# Patient Record
Sex: MALE | Race: WHITE
[De-identification: names, ages, dates, MRNs, and addresses within clinical notes are randomized per-mention and may not be internally consistent; named-entity substitution may affect disease eponyms.]

---

## 2017-01-25 ENCOUNTER — HOSPITAL ENCOUNTER (OUTPATIENT)
Dept: HOSPITAL 62 - LAB | Age: 72
End: 2017-01-25
Attending: INTERNAL MEDICINE
Payer: MEDICARE

## 2017-01-25 DIAGNOSIS — R80.9: ICD-10-CM

## 2017-01-25 DIAGNOSIS — E11.9: ICD-10-CM

## 2017-01-25 DIAGNOSIS — I12.9: Primary | ICD-10-CM

## 2017-01-25 DIAGNOSIS — N18.2: ICD-10-CM

## 2017-01-25 LAB
ANION GAP SERPL CALC-SCNC: 17 MMOL/L (ref 5–19)
APPEARANCE UR: CLEAR
BILIRUB UR QL STRIP: NEGATIVE
BUN SERPL-MCNC: 28 MG/DL (ref 7–20)
CALCIUM: 9.9 MG/DL (ref 8.4–10.2)
CHLORIDE SERPL-SCNC: 104 MMOL/L (ref 98–107)
CO2 SERPL-SCNC: 22 MMOL/L (ref 22–30)
CREAT SERPL-MCNC: 1.16 MG/DL (ref 0.52–1.25)
ERYTHROCYTE [DISTWIDTH] IN BLOOD BY AUTOMATED COUNT: 16.1 % (ref 11.5–14)
GLUCOSE SERPL-MCNC: 162 MG/DL (ref 75–110)
GLUCOSE UR STRIP-MCNC: NEGATIVE MG/DL
HCT VFR BLD CALC: 38.8 % (ref 37.9–51)
HGB BLD-MCNC: 12 G/DL (ref 13.5–17)
HGB HCT DIFFERENCE: -2.8
KETONES UR STRIP-MCNC: NEGATIVE MG/DL
MCH RBC QN AUTO: 25.1 PG (ref 27–33.4)
MCHC RBC AUTO-ENTMCNC: 30.9 G/DL (ref 32–36)
MCV RBC AUTO: 81 FL (ref 80–97)
NITRITE UR QL STRIP: NEGATIVE
PH UR STRIP: 5 [PH] (ref 5–9)
POTASSIUM SERPL-SCNC: 4.9 MMOL/L (ref 3.6–5)
PROT UR STRIP-MCNC: NEGATIVE MG/DL
RBC # BLD AUTO: 4.78 10^6/UL (ref 4.35–5.55)
SODIUM SERPL-SCNC: 142.6 MMOL/L (ref 137–145)
SP GR UR STRIP: 1.02
UROBILINOGEN UR-MCNC: NEGATIVE MG/DL (ref ?–2)
WBC # BLD AUTO: 6.6 10^3/UL (ref 4–10.5)

## 2017-01-25 PROCEDURE — 80048 BASIC METABOLIC PNL TOTAL CA: CPT

## 2017-01-25 PROCEDURE — 85027 COMPLETE CBC AUTOMATED: CPT

## 2017-01-25 PROCEDURE — 81001 URINALYSIS AUTO W/SCOPE: CPT

## 2017-01-25 PROCEDURE — 36415 COLL VENOUS BLD VENIPUNCTURE: CPT

## 2017-08-08 ENCOUNTER — HOSPITAL ENCOUNTER (OUTPATIENT)
Dept: HOSPITAL 62 - LAB | Age: 72
End: 2017-08-08
Attending: INTERNAL MEDICINE
Payer: MEDICARE

## 2017-08-08 DIAGNOSIS — E11.9: Primary | ICD-10-CM

## 2017-08-08 DIAGNOSIS — E78.5: ICD-10-CM

## 2017-08-08 DIAGNOSIS — R35.1: ICD-10-CM

## 2017-08-08 DIAGNOSIS — R53.82: ICD-10-CM

## 2017-08-08 DIAGNOSIS — I10: ICD-10-CM

## 2017-08-08 LAB
ALBUMIN SERPL-MCNC: 4.4 G/DL (ref 3.5–5)
ALP SERPL-CCNC: 72 U/L (ref 38–126)
ALT SERPL-CCNC: 31 U/L (ref 21–72)
ANION GAP SERPL CALC-SCNC: 14 MMOL/L (ref 5–19)
AST SERPL-CCNC: 24 U/L (ref 17–59)
BASOPHILS # BLD AUTO: 0 10^3/UL (ref 0–0.2)
BASOPHILS NFR BLD AUTO: 0.5 % (ref 0–2)
BILIRUB DIRECT SERPL-MCNC: 0.4 MG/DL (ref 0–0.4)
BILIRUB SERPL-MCNC: 0.7 MG/DL (ref 0.2–1.3)
BUN SERPL-MCNC: 21 MG/DL (ref 7–20)
CALCIUM: 9.6 MG/DL (ref 8.4–10.2)
CHLORIDE SERPL-SCNC: 104 MMOL/L (ref 98–107)
CHOLEST SERPL-MCNC: 122.78 MG/DL (ref 0–200)
CO2 SERPL-SCNC: 23 MMOL/L (ref 22–30)
CREAT SERPL-MCNC: 1.17 MG/DL (ref 0.52–1.25)
DIRECT HDL: 29 MG/DL (ref 40–?)
EOSINOPHIL # BLD AUTO: 0.2 10^3/UL (ref 0–0.6)
EOSINOPHIL NFR BLD AUTO: 2.5 % (ref 0–6)
ERYTHROCYTE [DISTWIDTH] IN BLOOD BY AUTOMATED COUNT: 15.1 % (ref 11.5–14)
GLUCOSE SERPL-MCNC: 154 MG/DL (ref 75–110)
HCT VFR BLD CALC: 39.5 % (ref 37.9–51)
HGB BLD-MCNC: 13 G/DL (ref 13.5–17)
HGB HCT DIFFERENCE: -0.5
LDLC SERPL DIRECT ASSAY-MCNC: 42 MG/DL (ref ?–100)
LYMPHOCYTES # BLD AUTO: 2.1 10^3/UL (ref 0.5–4.7)
LYMPHOCYTES NFR BLD AUTO: 26.5 % (ref 13–45)
MCH RBC QN AUTO: 26.5 PG (ref 27–33.4)
MCHC RBC AUTO-ENTMCNC: 33 G/DL (ref 32–36)
MCV RBC AUTO: 80 FL (ref 80–97)
MONOCYTES # BLD AUTO: 0.6 10^3/UL (ref 0.1–1.4)
MONOCYTES NFR BLD AUTO: 8 % (ref 3–13)
NEUTROPHILS # BLD AUTO: 5 10^3/UL (ref 1.7–8.2)
NEUTS SEG NFR BLD AUTO: 62.5 % (ref 42–78)
POTASSIUM SERPL-SCNC: 5 MMOL/L (ref 3.6–5)
PROT SERPL-MCNC: 8.2 G/DL (ref 6.3–8.2)
PSA SERPL-MCNC: 1.08 NG/ML (ref ?–4)
RBC # BLD AUTO: 4.91 10^6/UL (ref 4.35–5.55)
SODIUM SERPL-SCNC: 141 MMOL/L (ref 137–145)
TRIGL SERPL-MCNC: 332 MG/DL (ref ?–150)
VLDLC SERPL CALC-MCNC: 66.4 MG/DL (ref 10–31)
WBC # BLD AUTO: 8 10^3/UL (ref 4–10.5)

## 2017-08-08 PROCEDURE — 80053 COMPREHEN METABOLIC PANEL: CPT

## 2017-08-08 PROCEDURE — 80061 LIPID PANEL: CPT

## 2017-08-08 PROCEDURE — 84153 ASSAY OF PSA TOTAL: CPT

## 2017-08-08 PROCEDURE — 83036 HEMOGLOBIN GLYCOSYLATED A1C: CPT

## 2017-08-08 PROCEDURE — 84443 ASSAY THYROID STIM HORMONE: CPT

## 2017-08-08 PROCEDURE — 85025 COMPLETE CBC W/AUTO DIFF WBC: CPT

## 2017-08-08 PROCEDURE — 36415 COLL VENOUS BLD VENIPUNCTURE: CPT

## 2017-11-28 ENCOUNTER — HOSPITAL ENCOUNTER (OUTPATIENT)
Dept: HOSPITAL 62 - LAB | Age: 72
End: 2017-11-28
Attending: INTERNAL MEDICINE
Payer: MEDICARE

## 2017-11-28 DIAGNOSIS — E11.9: Primary | ICD-10-CM

## 2017-11-28 DIAGNOSIS — I10: ICD-10-CM

## 2017-11-28 DIAGNOSIS — E78.5: ICD-10-CM

## 2017-11-28 LAB
ALBUMIN SERPL-MCNC: 4.4 G/DL (ref 3.5–5)
ALP SERPL-CCNC: 64 U/L (ref 38–126)
ALT SERPL-CCNC: 36 U/L (ref 21–72)
ANION GAP SERPL CALC-SCNC: 15 MMOL/L (ref 5–19)
AST SERPL-CCNC: 28 U/L (ref 17–59)
BASOPHILS # BLD AUTO: 0 10^3/UL (ref 0–0.2)
BASOPHILS NFR BLD AUTO: 0.3 % (ref 0–2)
BILIRUB DIRECT SERPL-MCNC: 0.3 MG/DL (ref 0–0.4)
BILIRUB SERPL-MCNC: 0.6 MG/DL (ref 0.2–1.3)
BUN SERPL-MCNC: 24 MG/DL (ref 7–20)
CALCIUM: 10 MG/DL (ref 8.4–10.2)
CHLORIDE SERPL-SCNC: 103 MMOL/L (ref 98–107)
CHOLEST SERPL-MCNC: 144.39 MG/DL (ref 0–200)
CO2 SERPL-SCNC: 25 MMOL/L (ref 22–30)
CREAT SERPL-MCNC: 1.32 MG/DL (ref 0.52–1.25)
DIRECT HDL: 34 MG/DL (ref 40–?)
EOSINOPHIL # BLD AUTO: 0.1 10^3/UL (ref 0–0.6)
EOSINOPHIL NFR BLD AUTO: 2.3 % (ref 0–6)
ERYTHROCYTE [DISTWIDTH] IN BLOOD BY AUTOMATED COUNT: 16.1 % (ref 11.5–14)
GLUCOSE SERPL-MCNC: 179 MG/DL (ref 75–110)
HCT VFR BLD CALC: 39.9 % (ref 37.9–51)
HGB BLD-MCNC: 12.9 G/DL (ref 13.5–17)
HGB HCT DIFFERENCE: -1.2
LDLC SERPL DIRECT ASSAY-MCNC: 59 MG/DL (ref ?–100)
LYMPHOCYTES # BLD AUTO: 2 10^3/UL (ref 0.5–4.7)
LYMPHOCYTES NFR BLD AUTO: 32.7 % (ref 13–45)
MCH RBC QN AUTO: 26.4 PG (ref 27–33.4)
MCHC RBC AUTO-ENTMCNC: 32.4 G/DL (ref 32–36)
MCV RBC AUTO: 81 FL (ref 80–97)
MONOCYTES # BLD AUTO: 0.6 10^3/UL (ref 0.1–1.4)
MONOCYTES NFR BLD AUTO: 10.5 % (ref 3–13)
NEUTROPHILS # BLD AUTO: 3.2 10^3/UL (ref 1.7–8.2)
NEUTS SEG NFR BLD AUTO: 54.2 % (ref 42–78)
POTASSIUM SERPL-SCNC: 5.3 MMOL/L (ref 3.6–5)
PROT SERPL-MCNC: 7.6 G/DL (ref 6.3–8.2)
RBC # BLD AUTO: 4.9 10^6/UL (ref 4.35–5.55)
SODIUM SERPL-SCNC: 143.3 MMOL/L (ref 137–145)
TRIGL SERPL-MCNC: 362 MG/DL (ref ?–150)
VLDLC SERPL CALC-MCNC: 72.4 MG/DL (ref 10–31)
WBC # BLD AUTO: 6 10^3/UL (ref 4–10.5)

## 2017-11-28 PROCEDURE — 80061 LIPID PANEL: CPT

## 2017-11-28 PROCEDURE — 36415 COLL VENOUS BLD VENIPUNCTURE: CPT

## 2017-11-28 PROCEDURE — 85025 COMPLETE CBC W/AUTO DIFF WBC: CPT

## 2017-11-28 PROCEDURE — 80053 COMPREHEN METABOLIC PANEL: CPT

## 2017-11-28 PROCEDURE — 83036 HEMOGLOBIN GLYCOSYLATED A1C: CPT

## 2017-11-29 ENCOUNTER — HOSPITAL ENCOUNTER (OUTPATIENT)
Dept: HOSPITAL 62 - RAD | Age: 72
End: 2017-11-29
Attending: INTERNAL MEDICINE
Payer: MEDICARE

## 2017-11-29 DIAGNOSIS — M25.511: Primary | ICD-10-CM

## 2017-11-30 NOTE — RADIOLOGY REPORT (SQ)
EXAM DESCRIPTION:  MRI RT UPPER JOINT WITHOUT



COMPLETED DATE/TIME:  11/29/2017 2:38 pm



REASON FOR STUDY:  M25.511 PAIN IN RIGHT SHOULDER M25.511  PAIN IN RIGHT SHOULDER



COMPARISON:  None.



TECHNIQUE:  Right shoulder images acquired and stored on PACS. Multiplanar imaging to include fat sen
sitive sequences such as T1, water sensitive sequences such as FST2/STIR, cartilage sensitive sequenc
es such as FSPD/gradient-echo sequences.



LIMITATIONS:  None.



FINDINGS:  BONE MARROW AND CORTEX: No worrisome bone lesions or marrow replacement. No occult fractur
es.

JOINT OR BURSAL EFFUSION: There is fluid in the subacromial/subdeltoid bursarelated to full-thickness
 supraspinatus tear

GLENO-HUMERAL ARTICULATION: Normal articulation. No subluxation. No cystic change.  Mild chondromalac
ia at the glenohumeral joint.

ACROMION AND AC JOINT: Type 2  with very bulky acromioclavicular joint bony spurring, and narrowing o
f the subacromial recess

ROTATOR CUFF AND INTERVAL: Full-thickness tear distal supraspinatus tendon.  Bone tendinopathy infras
pinatus and subscapularis tendons.

 LABRUM  AND BICEPS LABRAL COMPLEX: Intra-articular long head biceps tendon is thin and high signal f
rom tendinopathy.  Superior labral tear extending throughout the posterior labrum.  No paralabral cys
ts.

REMAINDER OF LABRUM AND IGHL : No gross tear or paralabral cyst formation.  Labral evaluation is less
 than optimal without joint distention. No thickening of IGHL to suggest adhesive capsulitis.

PERIARTICULAR AND ADJACENT SOFT TISSUES: No masses or abnormal nodes.

OTHER: No other significant finding.



IMPRESSION:  Full-thickness supraspinatus tendon tear

Superior labral tear extending throughout the posterior labrum



TECHNICAL DOCUMENTATION:  JOB ID:  [id number]

 2011 [employer]- All Rights Reserved

## 2018-04-11 ENCOUNTER — HOSPITAL ENCOUNTER (OUTPATIENT)
Dept: HOSPITAL 62 - OD | Age: 73
End: 2018-04-11
Attending: FAMILY MEDICINE
Payer: MEDICARE

## 2018-04-11 DIAGNOSIS — R06.2: ICD-10-CM

## 2018-04-11 DIAGNOSIS — J20.9: Primary | ICD-10-CM

## 2018-04-11 PROCEDURE — 71046 X-RAY EXAM CHEST 2 VIEWS: CPT

## 2018-04-11 NOTE — RADIOLOGY REPORT (SQ)
EXAM DESCRIPTION:  CHEST PA/LATERAL



COMPLETED DATE/TIME:  4/11/2018 11:07 am



REASON FOR STUDY:  ACUTE BRONCHITIS, UNSPECIFIED,WHEEZING



COMPARISON:  8/10/2014, 8/6/2014



EXAM PARAMETERS:  NUMBER OF VIEWS: two views

TECHNIQUE: Digital Frontal and Lateral radiographic views of the chest acquired.

RADIATION DOSE: NA

LIMITATIONS: none



FINDINGS:  LUNGS AND PLEURA: No opacities, masses or pneumothorax. No pleural effusion.

MEDIASTINUM AND HILAR STRUCTURES: No masses or contour abnormalities.

HEART AND VASCULAR STRUCTURES: Mild cardiomegaly.

BONES: No acute findings.

HARDWARE: None in the chest.

OTHER: No other significant finding.



IMPRESSION:  Mild cardiomegaly.  No acute infiltrates.



TECHNICAL DOCUMENTATION:  JOB ID:  [id number]

 2011 [employer]- All Rights Reserved



Reading location - IP/workstation name: [email or web address]

## 2018-04-16 ENCOUNTER — HOSPITAL ENCOUNTER (OUTPATIENT)
Dept: HOSPITAL 62 - SP | Age: 73
End: 2018-04-16
Attending: FAMILY MEDICINE
Payer: MEDICARE

## 2018-04-16 DIAGNOSIS — I51.7: Primary | ICD-10-CM

## 2018-04-16 PROCEDURE — 93306 TTE W/DOPPLER COMPLETE: CPT

## 2018-04-17 NOTE — XCELERA REPORT
[address] [address]

                             Tel: [phone]

                             Fax: [phone]



                    Transthoracic Echocardiogram Report

____________________________________________________________________________



Name: BRANDI ROBLES

MRN: [id number]                Age: 72 yrs

Gender: Male                   : 1945

Patient Status: Outpatient     Patient Location: 

Account #: [id number]

Study Date: 2018 09:22 AM

Accession #: [id number]

____________________________________________________________________________



Height: 68 in        Weight: 223 lb        BSA: 2.1 m2



____________________________________________________________________________

Procedure: A complete two-dimensional transthoracic echocardiogram was

performed (2D, M-mode, spectral and color flow Doppler). The study was

technically adequate with some images being suboptimal in quality.

Reason For Study: CARDIOMEGALY





Ordering Physician: MAYRA ROYAL

Performed By: Momo Andersen

____________________________________________________________________________





Interpretation Summary

The left ventricular ejection fraction is normal.

There is borderline concentric left ventricular hypertrophy.

The left ventricle is grossly normal size.

Doppler measurements suggest pseudonormalized left ventricular relaxation,

which is associated with grade II/IV or mild to moderate diastolic

dysfunction

Wall motion cannot be accurately commented on, but no definite regional

wall motion abnormalities noted.

The right ventricular systolic function is normal.

The left atrial size is normal.

The right atrium is normal in size

There is a mild amount of mitral regurgitation

There is no mitral valve stenosis.

There is no aortic valve stenosis

No aortic regurgitation is present.

There is a trace or physiologic amount of tricuspid regurgitation

Tricuspid regurgitation jet envelope not well defined to measure RV

systolic pressure accurately.

The aortic root is not well visualized but is probably normal size.

The inferior vena cava was not well visualized

There is no pericardial effusion.



____________________________________________________________________________



MMode/2D Measurements & Calculations

RVDd: 2.7 cm  LVIDd: 6.0 cm   FS: 33.1 %            Ao root diam: 2.9 cm

IVSd: 0.77 cm LVIDs: 4.0 cm   EDV(Teich): 181.4 ml

              LVPWd: 0.83 cm  ESV(Teich): 71.2 ml   Ao root area: 6.7 cm2

                              EF(Teich): 60.8 %     LA dimension: 3.4 cm



Doppler Measurements & Calculations

MV E max meron:      MV P1/2t max meron:    Ao V2 max:       LV V1 max P.3 cm/sec        93.3 cm/sec          141.6 cm/sec     4.7 mmHg

MV A max meron:      MV P1/2t: 62.9 msec  Ao max PG:       LV V1 max:

83.4 cm/sec                             8.0 mmHg         108.6 cm/sec

MV E/A: 1.1        MVA(P1/2t): 3.5 cm2                   LV dP/dt:

                   MV dec slope:                         1023 mmHg/s

                   434.6 cm/sec2



        _____________________________________________________________

PA V2 max:         TR max meron:

115.5 cm/sec       217.2 cm/sec

PA max PG:         TR max P.9 mmHg

5.3 mmHg

____________________________________________________________________________



Left Ventricle

The left ventricle is grossly normal size. There is borderline concentric

left ventricular hypertrophy. The left ventricular ejection fraction is

normal. Doppler measurements suggest pseudonormalized left ventricular

relaxation, which is associated with grade II/IV or mild to moderate

diastolic dysfunction. Wall motion cannot be accurately commented on, but

no definite regional wall motion abnormalities noted.



Right Ventricle

The right ventricle is grossly normal size. There is normal right

ventricular wall thickness. The right ventricular systolic function is

normal.



Atria

The right atrium is normal in size. The left atrial size is normal.

Interarterial septum not well visualized and not well dopplered. Cannot

comment on ASD/PFO presence.



Mitral Valve

The mitral valve is grossly normal. There is no mitral valve stenosis.

There is a mild amount of mitral regurgitation.





Aortic Valve

The aortic valve is not well visualized secondary to technical limitations.

There is no aortic valve stenosis. No aortic regurgitation is present.



Tricuspid Valve

The tricuspid valve is not well visualized secondary to technical

limitations. There is no tricuspid stenosis. There is a trace or

physiologic amount of tricuspid regurgitation. Tricuspid regurgitation jet

envelope not well defined to measure RV systolic pressure accurately.



Pulmonic Valve

The pulmonic valve is not well visualized.



Great Vessels

The aortic root is not well visualized but is probably normal size. The

inferior vena cava was not well visualized.



Effusions

There is no pericardial effusion.





____________________________________________________________________________

Electronically signed by:      Mnoa Caldera      on 2018 10:07 AM



CC: MAYRA ROYAL

>

Mona Caldera

## 2018-07-16 ENCOUNTER — HOSPITAL ENCOUNTER (EMERGENCY)
Dept: HOSPITAL 62 - ER | Age: 73
Discharge: HOME | End: 2018-07-16
Payer: MEDICARE

## 2018-07-16 VITALS — SYSTOLIC BLOOD PRESSURE: 139 MMHG | DIASTOLIC BLOOD PRESSURE: 76 MMHG

## 2018-07-16 DIAGNOSIS — Z86.61: ICD-10-CM

## 2018-07-16 DIAGNOSIS — M54.2: ICD-10-CM

## 2018-07-16 DIAGNOSIS — R51: ICD-10-CM

## 2018-07-16 DIAGNOSIS — E11.9: ICD-10-CM
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
## 2018-07-16 DIAGNOSIS — S16.1XXA: Primary | ICD-10-CM

## 2018-07-16 LAB
ADD MANUAL DIFF: NO
ANION GAP SERPL CALC-SCNC: 16 MMOL/L (ref 5–19)
BASOPHILS # BLD AUTO: 0 10^3/UL (ref 0–0.2)
BASOPHILS NFR BLD AUTO: 0.4 % (ref 0–2)
BUN SERPL-MCNC: 25 MG/DL (ref 7–20)
CALCIUM: 9.5 MG/DL (ref 8.4–10.2)
CHLORIDE SERPL-SCNC: 102 MMOL/L (ref 98–107)
CO2 SERPL-SCNC: 27 MMOL/L (ref 22–30)
EOSINOPHIL # BLD AUTO: 0 10^3/UL (ref 0–0.6)
EOSINOPHIL NFR BLD AUTO: 0.4 % (ref 0–6)
ERYTHROCYTE [DISTWIDTH] IN BLOOD BY AUTOMATED COUNT: 15.9 % (ref 11.5–14)
GLUCOSE SERPL-MCNC: 186 MG/DL (ref 75–110)
HCT VFR BLD CALC: 37.1 % (ref 37.9–51)
HGB BLD-MCNC: 12.1 G/DL (ref 13.5–17)
LYMPHOCYTES # BLD AUTO: 1.6 10^3/UL (ref 0.5–4.7)
LYMPHOCYTES NFR BLD AUTO: 14.8 % (ref 13–45)
MCH RBC QN AUTO: 26.5 PG (ref 27–33.4)
MCHC RBC AUTO-ENTMCNC: 32.6 G/DL (ref 32–36)
MCV RBC AUTO: 81 FL (ref 80–97)
MONOCYTES # BLD AUTO: 1 10^3/UL (ref 0.1–1.4)
MONOCYTES NFR BLD AUTO: 9 % (ref 3–13)
NEUTROPHILS # BLD AUTO: 8 10^3/UL (ref 1.7–8.2)
NEUTS SEG NFR BLD AUTO: 75.4 % (ref 42–78)
PLATELET # BLD: 296 10^3/UL (ref 150–450)
POTASSIUM SERPL-SCNC: 5.3 MMOL/L (ref 3.6–5)
RBC # BLD AUTO: 4.56 10^6/UL (ref 4.35–5.55)
SODIUM SERPL-SCNC: 144.7 MMOL/L (ref 137–145)
TOTAL CELLS COUNTED % (AUTO): 100 %
WBC # BLD AUTO: 10.6 10^3/UL (ref 4–10.5)

## 2018-07-16 PROCEDURE — 85025 COMPLETE CBC W/AUTO DIFF WBC: CPT

## 2018-07-16 PROCEDURE — 36415 COLL VENOUS BLD VENIPUNCTURE: CPT

## 2018-07-16 PROCEDURE — 99284 EMERGENCY DEPT VISIT MOD MDM: CPT

## 2018-07-16 PROCEDURE — 80048 BASIC METABOLIC PNL TOTAL CA: CPT

## 2018-07-16 NOTE — ER DOCUMENT REPORT
ED General





- General


Chief Complaint: Headache


Stated Complaint: HEADACHE


Time Seen by Provider: 07/16/18 15:35


Mode of Arrival: Ambulatory


Information source: Patient, Relative


Notes: 





72-year-old male with type 2 diabetes presents with complaint of headache and 

neck pain that started 5 days prior to arrival.  Patient describes the pain as 

constant, throbbing and worse with movement.  Patient did have a headache 

yesterday but does not have one now.  He denies any fever, chills, nausea,, 

sick contacts.  When asked if patient had prior similar symptoms EMS when he 

had viral meningitis in 2014.  He also states that this pain feels when he had 

meningitis.  He states the difference is that time he was confused, combative 

and felt much sicker.  He denies any injury to his neck, recent falls.  He has 

tried Motrin with


TRAVEL OUTSIDE OF THE [place] IN LAST 30 DAYS: No





- HPI


Onset: Last week


Onset/Duration: Gradual, Persistent, Worse


Quality of pain: Throbbing


Severity: Moderate


Pain Level: 1


Associated symptoms: denies: Body/muscle aches, Chest pain, Earache, Fever, 

Hurts to breath, Nausea, Vomiting, Sinus pain/drainage, Shortness of breath, 

Sweating, Weakness


Exacerbated by: Movement


Relieved by: Remaining still


Similar symptoms previously: Yes


Recently seen / treated by doctor: No





- Related Data


Allergies/Adverse Reactions: 


 





No Known Allergies Allergy (Verified 08/06/14 18:26)


 











Past Medical History





- General


Information source: Patient, [facility] Records





- Social History


Smoking Status: Never Smoker


Frequency of alcohol use: Rare


Drug Abuse: None


Lives with: Family


Family History: Reviewed & Not Pertinent


Patient has suicidal ideation: No


Patient has homicidal ideation: No


Pulmonary Medical History: Reports: Hx Bronchitis


Endocrine Medical History: Reports: Hx Diabetes Mellitus Type 2


Renal/ Medical History: Denies: Hx Peritoneal Dialysis





Review of Systems





- Review of Systems


Constitutional: denies: Chills, Diaphoresis, Fever, Weakness, Recent illness


EENT: denies: Blurred vision, Double vision, Difficulty swallowing


Cardiovascular: denies: Chest pain, Palpitations, Dizziness, Lightheaded


Respiratory: denies: Cough, Short of breath


Gastrointestinal: denies: Abdominal pain, Nausea, Vomiting


Genitourinary: denies: Dysuria, Flank pain


Male Genitourinary: No symptoms reported


Musculoskeletal: Muscle stiffness, Neck pain.  denies: Back pain


Skin: denies: Rash


Hematologic/Lymphatic: denies: Easy bruising


Neurological/Psychological: denies: Confusion, Hallucinations, Weakness, Lost 

consciousness, Headaches


-: Yes All other systems reviewed and negative





Physical Exam





- Vital signs


Vitals: 


 











Temp Pulse Resp BP Pulse Ox


 


 97.7 F   94   18   149/81 H  95 


 


 07/16/18 15:15  07/16/18 15:15  07/16/18 15:15  07/16/18 15:15  07/16/18 15:15














- Notes


Notes: 





PHYSICAL EXAMINATION:





GENERAL: Well-appearing, well-nourished and in no acute distress.





HEAD: Atraumatic, normocephalic.





EYES: Pupils equal round and reactive to light, extraocular movements intact, 

sclera anicteric, conjunctiva are normal.





ENT: Nares patent, oropharynx clear without exudates.  Moist mucous membranes.





NECK: Supple without lymphadenopathy.  No nuchal rigidity, meningismus.  

Patient does have limited range of motion secondary to pain.  No midline 

tenderness.  Tenderness to palpation of the occiput.





LUNGS: Breath sounds clear to auscultation bilaterally and equal.  No wheezes 

rales or rhonchi.





HEART: Regular rate and rhythm without murmurs





ABDOMEN: Soft, nontender, nondistended abdomen.  No guarding, no rebound.  No 

masses appreciated.





Musculoskeletal: Normal range of motion, no pitting or edema.  No cyanosis.





NEUROLOGICAL: Cranial nerves grossly intact.  Normal speech, normal gait.  

Normal sensory, motor exams 





PSYCH: Normal mood, normal affect.





SKIN: Warm, Dry, normal turgor, no rashes or lesions noted.





Course





- Re-evaluation


Re-evalutation: 





07/16/18 19:01





Laboratory











  07/16/18 07/16/18





  16:20 16:20


 


WBC  10.6 H 


 


RBC  4.56 


 


Hgb  12.1 L 


 


Hct  37.1 L 


 


MCV  81 


 


MCH  26.5 L 


 


MCHC  32.6 


 


RDW  15.9 H 


 


Plt Count  296 


 


Seg Neutrophils %  75.4 


 


Lymphocytes %  14.8 


 


Monocytes %  9.0 


 


Eosinophils %  0.4 


 


Basophils %  0.4 


 


Absolute Neutrophils  8.0 


 


Absolute Lymphocytes  1.6 


 


Absolute Monocytes  1.0 


 


Absolute Eosinophils  0.0 


 


Absolute Basophils  0.0 


 


Sodium   144.7


 


Potassium   5.3 H


 


Chloride   102


 


Carbon Dioxide   27


 


Anion Gap   16


 


BUN   25 H


 


Creatinine   1.32 H


 


Est GFR ( Amer)   > 60


 


Est GFR (Non-Af Amer)   53 L


 


Glucose   186 H


 


Calcium   9.5








Patient reevaluated and states neck pain has greatly improved.  He has not had 

any headache during his ED course.  We did discuss his history of viral 

meningitis and a lumbar puncture which the patient declines at this time.  He 

states that this pain feels completely different than when he had viral 

meningitis in 2014.  Daughter is at the bedside and states that patient was 

altered at that time, complaining of a severe headache.  CBC is without a 

significant leukocytosis or anemia.  CMP does show an elevated creatinine 1.32 

but upon review of patient's previous lab work this is his baseline.  Patient's 

exam is just not consistent with viral or bacterial meningitis.  Exam is more 

consistent with musculoskeletal neck pain.  Patient did receive [place] during 

his ED course and states that he would like to try a short course for home.


07/17/18 20:09








- Vital Signs


Vital signs: 


 











Temp Pulse Resp BP Pulse Ox


 


 97.7 F   90   18   139/76 H  98 


 


 07/16/18 15:15  07/16/18 19:57  07/16/18 19:57  07/16/18 19:57  07/16/18 19:57














- Laboratory


Result Diagrams: 


 07/16/18 16:20





 07/16/18 16:20


Laboratory results interpreted by me: 


 











  07/16/18 07/16/18





  16:20 16:20


 


WBC  10.6 H 


 


Hgb  12.1 L 


 


Hct  37.1 L 


 


MCH  26.5 L 


 


RDW  15.9 H 


 


Potassium   5.3 H


 


BUN   25 H


 


Creatinine   1.32 H


 


Est GFR (Non-Af Amer)   53 L


 


Glucose   186 H














Discharge





- Discharge


Clinical Impression: 


Cervical strain, acute


Qualifiers:


 Encounter type: initial encounter Qualified Code(s): S16.1XXA - Strain of 

muscle, fascia and tendon at neck level, initial encounter





Condition: Good


Disposition: HOME, SELF-CARE


Instructions:  Neck Injury (Cervical Strain) ([facility])


Additional Instructions: 


Follow up with your physician tomorrow for further care or return to the ED 

IMMEDIATELY if symptoms worsen or new concerns occur. If you cannot afford to 

follow up with your primary care physician a list of low cost clinics have been 

provided at the end of your discharge papers as well.


Prescriptions: 


Hydrocodone/Acetaminophen [Norco 5-325 mg Tablet] 1 tab PO Q6H #12 tablet


Referrals: 


MAYRA ROAYL MD [COMMUNITY BASED STAFF] - Follow up in 3-5 days

## 2018-07-16 NOTE — ER DOCUMENT REPORT
ED Medical Screen (RME)





- General


Chief Complaint: Headache


Stated Complaint: HEADACHE


Time Seen by Provider: 07/16/18 15:35


Notes: 





RAPID MEDICAL EVALUATION DISCLOSURE


I have seen this patient as part of a Rapid Medical Evaluation and, if 

applicable, placed any initially appropriate orders. The patient will be seen 

and fully evaluated, including a full history and physical exam, by a provider (

in Main ED or Fast Track) when a room becomes available.





------------------------------------------------------------------





72M here w c/o neck pain past few days but worse today and much more severe. He 

has also had a HA in the back of the head but does not have one at this time. 

he has tried motrin for the sx without much relief. He initially told the [facility] 

RN that it felt like his meningitis previously but denies this to me. He has 

also been having whole body cramps "every single night". He was recently tx for 

bronchitis and was doing a lot of coughing and wonders if this may have caused 

him to have the neck pain/HA.





EXAM


exquisite bilateral cervical paraspinal mm TTP


strength 5/5 w intact sensation all extremities











TRAVEL OUTSIDE OF THE [place] IN LAST 30 DAYS: No





- Related Data


Allergies/Adverse Reactions: 


 





No Known Allergies Allergy (Verified 08/06/14 18:26)


 











Physical Exam





- Vital signs


Vitals: 





 











Temp Pulse Resp BP Pulse Ox


 


 97.7 F   94   18   149/81 H  95 


 


 07/16/18 15:15  07/16/18 15:15  07/16/18 15:15  07/16/18 15:15  07/16/18 15:15














Course





- Vital Signs


Vital signs: 





 











Temp Pulse Resp BP Pulse Ox


 


 97.7 F   94   18   149/81 H  95 


 


 07/16/18 15:15  07/16/18 15:15  07/16/18 15:15  07/16/18 15:15  07/16/18 15:15














Doctor's Discharge





- Discharge


Referrals: 


MAYRA ROYAL MD [Primary Care Provider] - Follow up as needed

## 2018-07-23 ENCOUNTER — HOSPITAL ENCOUNTER (OUTPATIENT)
Dept: HOSPITAL 62 - LAB | Age: 73
End: 2018-07-23
Attending: INTERNAL MEDICINE
Payer: MEDICARE

## 2018-07-23 DIAGNOSIS — R35.1: ICD-10-CM

## 2018-07-23 DIAGNOSIS — Z12.5: ICD-10-CM

## 2018-07-23 DIAGNOSIS — E78.4: ICD-10-CM

## 2018-07-23 DIAGNOSIS — R53.83: ICD-10-CM

## 2018-07-23 DIAGNOSIS — E11.9: Primary | ICD-10-CM

## 2018-07-23 DIAGNOSIS — I10: ICD-10-CM

## 2018-07-23 LAB
ADD MANUAL DIFF: NO
ALBUMIN SERPL-MCNC: 4.1 G/DL (ref 3.5–5)
ALP SERPL-CCNC: 66 U/L (ref 38–126)
ALT SERPL-CCNC: 29 U/L (ref 21–72)
ANION GAP SERPL CALC-SCNC: 14 MMOL/L (ref 5–19)
AST SERPL-CCNC: 40 U/L (ref 17–59)
BASOPHILS # BLD AUTO: 0 10^3/UL (ref 0–0.2)
BASOPHILS NFR BLD AUTO: 0.4 % (ref 0–2)
BILIRUB DIRECT SERPL-MCNC: 0.3 MG/DL (ref 0–0.4)
BILIRUB SERPL-MCNC: 0.6 MG/DL (ref 0.2–1.3)
BUN SERPL-MCNC: 29 MG/DL (ref 7–20)
CALCIUM: 9.5 MG/DL (ref 8.4–10.2)
CHLORIDE SERPL-SCNC: 103 MMOL/L (ref 98–107)
CHOLEST SERPL-MCNC: 102.77 MG/DL (ref 0–200)
CO2 SERPL-SCNC: 27 MMOL/L (ref 22–30)
EOSINOPHIL # BLD AUTO: 0.1 10^3/UL (ref 0–0.6)
EOSINOPHIL NFR BLD AUTO: 1.9 % (ref 0–6)
ERYTHROCYTE [DISTWIDTH] IN BLOOD BY AUTOMATED COUNT: 16 % (ref 11.5–14)
GLUCOSE SERPL-MCNC: 157 MG/DL (ref 75–110)
HCT VFR BLD CALC: 38.6 % (ref 37.9–51)
HGB BLD-MCNC: 12.6 G/DL (ref 13.5–17)
LDLC SERPL DIRECT ASSAY-MCNC: 35 MG/DL (ref ?–100)
LYMPHOCYTES # BLD AUTO: 1.8 10^3/UL (ref 0.5–4.7)
LYMPHOCYTES NFR BLD AUTO: 25.1 % (ref 13–45)
MCH RBC QN AUTO: 25.8 PG (ref 27–33.4)
MCHC RBC AUTO-ENTMCNC: 32.6 G/DL (ref 32–36)
MCV RBC AUTO: 79 FL (ref 80–97)
MONOCYTES # BLD AUTO: 0.6 10^3/UL (ref 0.1–1.4)
MONOCYTES NFR BLD AUTO: 7.9 % (ref 3–13)
NEUTROPHILS # BLD AUTO: 4.7 10^3/UL (ref 1.7–8.2)
NEUTS SEG NFR BLD AUTO: 64.7 % (ref 42–78)
PLATELET # BLD: 315 10^3/UL (ref 150–450)
POTASSIUM SERPL-SCNC: 5.3 MMOL/L (ref 3.6–5)
PROT SERPL-MCNC: 7.7 G/DL (ref 6.3–8.2)
RBC # BLD AUTO: 4.87 10^6/UL (ref 4.35–5.55)
SODIUM SERPL-SCNC: 144 MMOL/L (ref 137–145)
TOTAL CELLS COUNTED % (AUTO): 100 %
TRIGL SERPL-MCNC: 278 MG/DL (ref ?–150)
VLDLC SERPL CALC-MCNC: 55.6 MG/DL (ref 10–31)
WBC # BLD AUTO: 7.3 10^3/UL (ref 4–10.5)

## 2018-07-23 PROCEDURE — 84443 ASSAY THYROID STIM HORMONE: CPT

## 2018-07-23 PROCEDURE — G0103 PSA SCREENING: HCPCS

## 2018-07-23 PROCEDURE — 36415 COLL VENOUS BLD VENIPUNCTURE: CPT

## 2018-07-23 PROCEDURE — 80053 COMPREHEN METABOLIC PANEL: CPT

## 2018-07-23 PROCEDURE — 85025 COMPLETE CBC W/AUTO DIFF WBC: CPT

## 2018-07-23 PROCEDURE — 83036 HEMOGLOBIN GLYCOSYLATED A1C: CPT

## 2018-07-23 PROCEDURE — 80061 LIPID PANEL: CPT

## 2018-10-17 ENCOUNTER — HOSPITAL ENCOUNTER (OUTPATIENT)
Dept: HOSPITAL 62 - ER | Age: 73
Setting detail: OBSERVATION
LOS: 2 days | Discharge: HOME | End: 2018-10-19
Attending: INTERNAL MEDICINE | Admitting: INTERNAL MEDICINE
Payer: MEDICARE

## 2018-10-17 DIAGNOSIS — F40.240: ICD-10-CM

## 2018-10-17 DIAGNOSIS — M47.892: ICD-10-CM

## 2018-10-17 DIAGNOSIS — E11.22: ICD-10-CM

## 2018-10-17 DIAGNOSIS — Z87.891: ICD-10-CM

## 2018-10-17 DIAGNOSIS — E66.01: ICD-10-CM

## 2018-10-17 DIAGNOSIS — M50.33: ICD-10-CM

## 2018-10-17 DIAGNOSIS — Z79.4: ICD-10-CM

## 2018-10-17 DIAGNOSIS — R05: ICD-10-CM

## 2018-10-17 DIAGNOSIS — R51: ICD-10-CM

## 2018-10-17 DIAGNOSIS — E83.42: ICD-10-CM

## 2018-10-17 DIAGNOSIS — N18.3: ICD-10-CM

## 2018-10-17 DIAGNOSIS — Z79.899: ICD-10-CM

## 2018-10-17 DIAGNOSIS — E87.5: Primary | ICD-10-CM

## 2018-10-17 DIAGNOSIS — E78.5: ICD-10-CM

## 2018-10-17 DIAGNOSIS — Z79.82: ICD-10-CM

## 2018-10-17 DIAGNOSIS — Z86.19: ICD-10-CM

## 2018-10-17 DIAGNOSIS — I12.9: ICD-10-CM

## 2018-10-17 LAB
ADD MANUAL DIFF: NO
ALBUMIN SERPL-MCNC: 4.1 G/DL (ref 3.5–5)
ALP SERPL-CCNC: 66 U/L (ref 38–126)
ALT SERPL-CCNC: 23 U/L (ref 21–72)
ANION GAP SERPL CALC-SCNC: 10 MMOL/L (ref 5–19)
AST SERPL-CCNC: 26 U/L (ref 17–59)
BASOPHILS # BLD AUTO: 0 10^3/UL (ref 0–0.2)
BASOPHILS NFR BLD AUTO: 0.2 % (ref 0–2)
BILIRUB DIRECT SERPL-MCNC: 0.3 MG/DL (ref 0–0.4)
BILIRUB SERPL-MCNC: 0.8 MG/DL (ref 0.2–1.3)
BUN SERPL-MCNC: 19 MG/DL (ref 7–20)
CALCIUM: 9.5 MG/DL (ref 8.4–10.2)
CHLORIDE SERPL-SCNC: 104 MMOL/L (ref 98–107)
CO2 SERPL-SCNC: 24 MMOL/L (ref 22–30)
EOSINOPHIL # BLD AUTO: 0.1 10^3/UL (ref 0–0.6)
EOSINOPHIL NFR BLD AUTO: 1.4 % (ref 0–6)
ERYTHROCYTE [DISTWIDTH] IN BLOOD BY AUTOMATED COUNT: 16.3 % (ref 11.5–14)
GLUCOSE SERPL-MCNC: 167 MG/DL (ref 75–110)
HCT VFR BLD CALC: 38.6 % (ref 37.9–51)
HGB BLD-MCNC: 12.6 G/DL (ref 13.5–17)
INR PPP: 0.98
LYMPHOCYTES # BLD AUTO: 1.9 10^3/UL (ref 0.5–4.7)
LYMPHOCYTES NFR BLD AUTO: 20.1 % (ref 13–45)
MCH RBC QN AUTO: 26.4 PG (ref 27–33.4)
MCHC RBC AUTO-ENTMCNC: 32.6 G/DL (ref 32–36)
MCV RBC AUTO: 81 FL (ref 80–97)
MONOCYTES # BLD AUTO: 0.7 10^3/UL (ref 0.1–1.4)
MONOCYTES NFR BLD AUTO: 8 % (ref 3–13)
NEUTROPHILS # BLD AUTO: 6.5 10^3/UL (ref 1.7–8.2)
NEUTS SEG NFR BLD AUTO: 70.3 % (ref 42–78)
PLATELET # BLD: 229 10^3/UL (ref 150–450)
POTASSIUM SERPL-SCNC: 6.5 MMOL/L (ref 3.6–5)
PROT SERPL-MCNC: 7.6 G/DL (ref 6.3–8.2)
PROTHROMBIN TIME: 13.5 SEC (ref 11.4–15.4)
RBC # BLD AUTO: 4.77 10^6/UL (ref 4.35–5.55)
SODIUM SERPL-SCNC: 137.7 MMOL/L (ref 137–145)
TOTAL CELLS COUNTED % (AUTO): 100 %
WBC # BLD AUTO: 9.2 10^3/UL (ref 4–10.5)

## 2018-10-17 PROCEDURE — 96375 TX/PRO/DX INJ NEW DRUG ADDON: CPT

## 2018-10-17 PROCEDURE — 82962 GLUCOSE BLOOD TEST: CPT

## 2018-10-17 PROCEDURE — 84132 ASSAY OF SERUM POTASSIUM: CPT

## 2018-10-17 PROCEDURE — 81001 URINALYSIS AUTO W/SCOPE: CPT

## 2018-10-17 PROCEDURE — 87040 BLOOD CULTURE FOR BACTERIA: CPT

## 2018-10-17 PROCEDURE — 36415 COLL VENOUS BLD VENIPUNCTURE: CPT

## 2018-10-17 PROCEDURE — G0378 HOSPITAL OBSERVATION PER HR: HCPCS

## 2018-10-17 PROCEDURE — 96374 THER/PROPH/DIAG INJ IV PUSH: CPT

## 2018-10-17 PROCEDURE — 80048 BASIC METABOLIC PNL TOTAL CA: CPT

## 2018-10-17 PROCEDURE — 94640 AIRWAY INHALATION TREATMENT: CPT

## 2018-10-17 PROCEDURE — 93005 ELECTROCARDIOGRAM TRACING: CPT

## 2018-10-17 PROCEDURE — 71046 X-RAY EXAM CHEST 2 VIEWS: CPT

## 2018-10-17 PROCEDURE — 80053 COMPREHEN METABOLIC PANEL: CPT

## 2018-10-17 PROCEDURE — 83735 ASSAY OF MAGNESIUM: CPT

## 2018-10-17 PROCEDURE — 85025 COMPLETE CBC W/AUTO DIFF WBC: CPT

## 2018-10-17 PROCEDURE — 72125 CT NECK SPINE W/O DYE: CPT

## 2018-10-17 PROCEDURE — 85610 PROTHROMBIN TIME: CPT

## 2018-10-17 PROCEDURE — 99285 EMERGENCY DEPT VISIT HI MDM: CPT

## 2018-10-17 PROCEDURE — 93010 ELECTROCARDIOGRAM REPORT: CPT

## 2018-10-17 PROCEDURE — 83605 ASSAY OF LACTIC ACID: CPT

## 2018-10-17 PROCEDURE — 70450 CT HEAD/BRAIN W/O DYE: CPT

## 2018-10-17 NOTE — RADIOLOGY REPORT (SQ)
EXAM DESCRIPTION:  CT CERVICAL SPINE WITHOUT



COMPLETED DATE/TIME:  10/17/2018 8:04 pm



REASON FOR STUDY:  Headache, neck pain



COMPARISON:  None.



TECHNIQUE:  Axial images acquired through the cervical spine without intravenous contrast.  Images re
viewed with lung, soft tissue and bone windows.  Reconstructed coronal and sagittal MPR images review
ed.  Images stored on PACS.

All CT scanners at this facility use dose modulation, iterative reconstruction, and/or weight based d
osing when appropriate to reduce radiation dose to as low as reasonably achievable (ALARA).

[facility]: Dose Right  [facility]: CareDose    [facility]: Dose Right    [facility]: Teradose 4D    [facility]: Smart Technologies



RADIATION DOSE:  CT Rad equipment meets quality standard of care and radiation dose reduction techniq
ues were employed. CTDIvol: 24.2 mGy. DLP: 487 mGy-cm. mGy.



LIMITATIONS:  None.



FINDINGS:  ALIGNMENT: Anatomic.

MINERALIZATION: Normal.

VERTEBRAL BODIES: No fractures or dislocation.

DISCS: Disc spaces are narrowed from C3-C7 with small marginal osteophytes.

FACETS, LATERAL MASSES, POSTERIOR ELEMENTS: Multilevel facet hypertrophy, right more than left.

HARDWARE: None in the spine.

VISUALIZED RIBS: No fractures.

LUNG APICES AND SOFT TISSUES: No significant or acute findings.

OTHER: No other significant finding.



IMPRESSION:  Degenerative disc disease, facet arthropathy.  Mild spondylosis.  No acute findings.



TECHNICAL DOCUMENTATION:  JOB ID:  [id number]

Quality ID # 436: Final reports with documentation of one or more dose reduction techniques (e.g., Au
tomated exposure control, adjustment of the mA and/or kV according to patient size, use of iterative 
reconstruction technique)

 2011 [employer]- All Rights Reserved



Reading location - IP/workstation name: [email or web address]

## 2018-10-17 NOTE — RADIOLOGY REPORT (SQ)
EXAM DESCRIPTION:  CT HEAD WITHOUT



COMPLETED DATE/TIME:  10/17/2018 8:04 pm



REASON FOR STUDY:  Headache, neck pain



COMPARISON:  8/6/2014



TECHNIQUE:  Axial images acquired through the brain without intravenous contrast.  Images reviewed wi
th bone, brain and subdural windows.  Additional sagittal and coronal reconstructions were generated.
 Images stored on PACS.

All CT scanners at this facility use dose modulation, iterative reconstruction, and/or weight based d
osing when appropriate to reduce radiation dose to as low as reasonably achievable (ALARA).

[facility]: Dose Right  [facility]: CareDose    [facility]: Dose Right    [facility]: Teradose 4D    [facility]: Smart [employer] Match



RADIATION DOSE:  CT Rad equipment meets quality standard of care and radiation dose reduction techniq
ues were employed. CTDIvol: 53.2 mGy. DLP: 964 mGy-cm. mGy.



LIMITATIONS:  None.



FINDINGS:  VENTRICLES: Normal size and contour.

CEREBRUM: No masses.  No hemorrhage.  No midline shift.  No evidence for acute infarction. Few scatte
red areas of low density in the white matter most likely chronic small vessel ischemic changes.

CEREBELLUM: No masses.  No hemorrhage.  No alteration of density.  No evidence for acute infarction.

EXTRAAXIAL SPACES: No fluid collections.  No masses.

ORBITS AND GLOBE: No intra- or extraconal masses.  Normal contour of globe without masses.

CALVARIUM: No fracture.

PARANASAL SINUSES: No fluid or mucosal thickening.

SOFT TISSUES: No mass or hematoma.

OTHER: No other significant finding.



IMPRESSION:  MILD CHRONIC MICROVASCULAR ISCHEMIA. NO ACUTE IMAGING FINDINGS IN THE BRAIN.

EVIDENCE OF ACUTE STROKE: NO.



COMMENT:  Quality ID # 436: Final reports with documentation of one or more dose reduction techniques
 (e.g., Automated exposure control, adjustment of the mA and/or kV according to patient size, use of 
iterative reconstruction technique)



TECHNICAL DOCUMENTATION:  JOB ID:  [id number]

 2011 [employer]- All Rights Reserved



Reading location - IP/workstation name: [email or web address]

## 2018-10-17 NOTE — RADIOLOGY REPORT (SQ)
EXAM DESCRIPTION:  CHEST 2 VIEWS



COMPLETED DATE/TIME:  10/17/2018 8:06 pm



REASON FOR STUDY:  cough



COMPARISON:  8/10/2014



EXAM PARAMETERS:  NUMBER OF VIEWS: two views

TECHNIQUE: Digital Frontal and Lateral radiographic views of the chest acquired.

RADIATION DOSE: NA

LIMITATIONS: none



FINDINGS:  LUNGS AND PLEURA: No opacities, masses or pneumothorax. No pleural effusion.

MEDIASTINUM AND HILAR STRUCTURES: No masses or contour abnormalities.

HEART AND VASCULAR STRUCTURES: Heart normal size.  No evidence for failure.

BONES: No acute findings.

HARDWARE: None in the chest.

OTHER: No other significant finding.



IMPRESSION:  NO ACUTE RADIOGRAPHIC FINDING IN THE CHEST.



TECHNICAL DOCUMENTATION:  JOB ID:  [id number]

 2011 [employer]- All Rights Reserved



Reading location - IP/workstation name: [email or web address]
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
## 2018-10-18 LAB
ANION GAP SERPL CALC-SCNC: 16 MMOL/L (ref 5–19)
APPEARANCE UR: CLEAR
APTT PPP: YELLOW S
BILIRUB UR QL STRIP: NEGATIVE
BUN SERPL-MCNC: 19 MG/DL (ref 7–20)
CALCIUM: 9.9 MG/DL (ref 8.4–10.2)
CHLORIDE SERPL-SCNC: 102 MMOL/L (ref 98–107)
CO2 SERPL-SCNC: 22 MMOL/L (ref 22–30)
GLUCOSE SERPL-MCNC: 196 MG/DL (ref 75–110)
GLUCOSE UR STRIP-MCNC: 50 MG/DL
KETONES UR STRIP-MCNC: NEGATIVE MG/DL
NITRITE UR QL STRIP: NEGATIVE
PH UR STRIP: 5 [PH] (ref 5–9)
POTASSIUM SERPL-SCNC: 5.3 MMOL/L (ref 3.6–5)
PROT UR STRIP-MCNC: NEGATIVE MG/DL
SODIUM SERPL-SCNC: 139.5 MMOL/L (ref 137–145)
SP GR UR STRIP: 1.01
UROBILINOGEN UR-MCNC: NEGATIVE MG/DL (ref ?–2)

## 2018-10-18 RX ADMIN — ACETAMINOPHEN PRN MG: 325 TABLET ORAL at 02:27

## 2018-10-18 RX ADMIN — LIDOCAINE SCH: 50 PATCH CUTANEOUS at 09:09

## 2018-10-18 RX ADMIN — MAGNESIUM SULFATE IN DEXTROSE SCH MLS/HR: 10 INJECTION, SOLUTION INTRAVENOUS at 19:14

## 2018-10-18 RX ADMIN — OXYCODONE AND ACETAMINOPHEN PRN TAB: 5; 325 TABLET ORAL at 00:58

## 2018-10-18 RX ADMIN — OXYCODONE AND ACETAMINOPHEN PRN TAB: 5; 325 TABLET ORAL at 13:30

## 2018-10-18 RX ADMIN — SODIUM CHLORIDE PRN MLS/HR: 9 INJECTION, SOLUTION INTRAVENOUS at 13:30

## 2018-10-18 RX ADMIN — ENOXAPARIN SODIUM SCH: 40 INJECTION SUBCUTANEOUS at 09:09

## 2018-10-18 RX ADMIN — MAGNESIUM SULFATE IN DEXTROSE SCH MLS/HR: 10 INJECTION, SOLUTION INTRAVENOUS at 17:21

## 2018-10-18 RX ADMIN — TOBRAMYCIN AND DEXAMETHASONE SCH DROP: 3; 1 SUSPENSION/ DROPS OPHTHALMIC at 17:20

## 2018-10-18 RX ADMIN — SODIUM CHLORIDE PRN MLS/HR: 9 INJECTION, SOLUTION INTRAVENOUS at 02:27

## 2018-10-18 RX ADMIN — OXYCODONE AND ACETAMINOPHEN PRN TAB: 5; 325 TABLET ORAL at 17:20

## 2018-10-18 RX ADMIN — INSULIN LISPRO PRN UNIT: 100 INJECTION, SOLUTION INTRAVENOUS; SUBCUTANEOUS at 09:08

## 2018-10-18 RX ADMIN — INSULIN LISPRO PRN UNIT: 100 INJECTION, SOLUTION INTRAVENOUS; SUBCUTANEOUS at 13:30

## 2018-10-18 RX ADMIN — OXYCODONE AND ACETAMINOPHEN PRN TAB: 5; 325 TABLET ORAL at 05:36

## 2018-10-18 RX ADMIN — MAGNESIUM SULFATE IN DEXTROSE SCH MLS/HR: 10 INJECTION, SOLUTION INTRAVENOUS at 10:03

## 2018-10-18 RX ADMIN — MAGNESIUM SULFATE IN DEXTROSE SCH MLS/HR: 10 INJECTION, SOLUTION INTRAVENOUS at 06:41

## 2018-10-18 RX ADMIN — INSULIN LISPRO PRN UNIT: 100 INJECTION, SOLUTION INTRAVENOUS; SUBCUTANEOUS at 21:55

## 2018-10-18 NOTE — PROGRESS NOTE
Provider Note


Provider Note: 





The patient seen this morning on rounds.  He is sitting comfortably in the 

recliner stating that his pain in the neck is 1 out of 10.


2 daughters present at the bedside.


1 of the daughters before the conversation even started is demanding an MRI of 

his neck.


Explained to the patient and his daughter that his symptoms do not require an 

emergency MRI.


Advised the patient and the daughter that the patient is claustrophobic and 

will have difficulty completing the MRI and would probably be served better 

with an open MRI as an outpatient.


Discussed the use of medications, physical therapy and a spine surgeon as an 

outpatient.


The daughter disagrees with my recommendations.


She is being very rude.


I have discussed the case with the hospitalist group who has admitted the 

patient during the night and explained to them about the disagreement of the 

family with my recommendations.


They have agreed to accept patient to their service.


I have returned back to the room and explained to the patient and his daughter 

that I will no longer be willing to take care of him as a patient and I am 

discharging him from my services.


Just as I am leaving the room the daughter again makes a smart comment that it 

is for the best.


The patient will be discharged from my service and my office and I will no 

longer provide any kind of treatment to this patient.  I have severed my 

relationship with the patient.

## 2018-10-18 NOTE — PDOC PROGRESS REPORT
Subjective


Progress Note for:: 10/18/18


Subjective:: 





No adverse events overnight.  No new complaints.  His main concern is his neck 

pain.  He and his daughter asked about an MRI of his neck.  I reviewed the CT 

scan findings with him, and explained that the findings on CT adequately 

explain his neck discomfort.  Also, I explained he was admitted for electrolyte 

disturbances, and that with those findings and inpatient MRI may not be deemed 

medically necessary.  They both seem to be okay with that explanation.


Reason For Visit: 


HYPERKALEMIA








Physical Exam


Vital Signs: 


 











Temp Pulse Resp BP Pulse Ox


 


 98.2 F   79   18   141/79 H  95 


 


 10/18/18 15:39  10/18/18 15:39  10/18/18 15:39  10/18/18 15:39  10/18/18 15:39








 Intake & Output











 10/17/18 10/18/18 10/19/18





 06:59 06:59 06:59


 


Intake Total   1029


 


Output Total  300 


 


Balance  -300 1029











General appearance: PRESENT: no acute distress, cooperative, disheveled, 

morbidly obese


Neck exam: PRESENT: tenderness - Mild point tenderness with palpation.  ABSENT: 

full ROM


Respiratory exam: PRESENT: clear to auscultation karen, unlabored.  ABSENT: chest 

wall tenderness, rales, rhonchi, tachypnea, wheezes


Cardiovascular exam: PRESENT: RRR, +S1, +S2.  ABSENT: diastolic murmur, 

systolic murmur


GI/Abdominal exam: PRESENT: normal bowel sounds, soft.  ABSENT: guarding, 

rebound, tenderness


Extremities exam: ABSENT: clubbing, pedal edema


Musculoskeletal exam: PRESENT: ambulatory, normal inspection.  ABSENT: deformity


Neurological exam: PRESENT: alert, awake, oriented to person, oriented to place

, oriented to time


Psychiatric exam: PRESENT: appropriate affect, normal mood





Results


Laboratory Results: 


 





 10/18/18 04:14 





 











  10/18/18 10/18/18 10/18/18





  00:20 01:23 04:14


 


Sodium    139.5


 


Potassium   6.1 H*  5.3 H


 


Chloride    102


 


Carbon Dioxide    22


 


Anion Gap    16


 


BUN    19


 


Creatinine    1.38 H


 


Est GFR ( Amer)    > 60


 


Est GFR (Non-Af Amer)    50 L


 


Glucose    196 H


 


Calcium    9.9


 


Magnesium   


 


Urine Color  YELLOW  


 


Urine Appearance  CLEAR  


 


Urine pH  5.0  


 


Ur Specific Gravity  1.012  


 


Urine Protein  NEGATIVE  


 


Urine Glucose (UA)  50 H  


 


Urine Ketones  NEGATIVE  


 


Urine Blood  NEGATIVE  


 


Urine Nitrite  NEGATIVE  


 


Ur Leukocyte Esterase  NEGATIVE  


 


Urine WBC (Auto)  0  


 


Urine RBC (Auto)  0  














  10/18/18





  04:14


 


Sodium 


 


Potassium 


 


Chloride 


 


Carbon Dioxide 


 


Anion Gap 


 


BUN 


 


Creatinine 


 


Est GFR ( Amer) 


 


Est GFR (Non-Af Amer) 


 


Glucose 


 


Calcium 


 


Magnesium  1.3 L


 


Urine Color 


 


Urine Appearance 


 


Urine pH 


 


Ur Specific Gravity 


 


Urine Protein 


 


Urine Glucose (UA) 


 


Urine Ketones 


 


Urine Blood 


 


Urine Nitrite 


 


Ur Leukocyte Esterase 


 


Urine WBC (Auto) 


 


Urine RBC (Auto) 











Impressions: 


 





Cervical Spine CT  10/17/18 19:12


IMPRESSION:  Degenerative disc disease, facet arthropathy.  Mild spondylosis.  

No acute findings.


 








Head CT  10/17/18 19:12


IMPRESSION:  MILD CHRONIC MICROVASCULAR ISCHEMIA. NO ACUTE IMAGING FINDINGS IN 

THE BRAIN.


EVIDENCE OF ACUTE STROKE: NO.


 








Chest X-Ray  10/17/18 19:15


IMPRESSION:  NO ACUTE RADIOGRAPHIC FINDING IN THE CHEST.


 














Assessment & Plan





- Diagnosis


(1) Hyperkalemia


Is this a current diagnosis for this admission?: Yes   


Plan: 


Resolved.  Will probably try to find alternative blood pressure medications for 

him.  Interesting that he is hyperkalemic even though he is hypomagnesemic and 

on HCTZ, which should have offset any potassium gain from the lisinopril.  

Denies any exogenous sources of potassium such as a salt substitute.








(2) Hypomagnesemia


Is this a current diagnosis for this admission?: Yes   


Plan: 


Pacing this with IV magnesium.  He apparently is on a magnesium supplement at 

home so this is a chronic issue.  When he goes home will probably have him take 

his supplement twice a day instead of once a day.








(3) Neck pain


Is this a current diagnosis for this admission?: Yes   


Plan: 


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  He has multilevel 

degenerative disc disease.  He has a loss of the normal lordotic architecture 

of the cervical spine.  He has disc space narrowing at multiple consecutive 

levels.  He has bone spurring at various locations as well as facet hypertrophy 

at multiple levels.  He could probably benefit from an outpatient consultation 

with a spine specialist, but I am not sure how much relief surgery will give 

him.








- Time


Time Spent with patient: 25-34 minutes

## 2018-10-18 NOTE — EKG REPORT
SEVERITY:- BORDERLINE ECG -

SINUS RHYTHM

LEFT AXIS DEVIATION

BORDERLINE R WAVE PROGRESSION, ANTERIOR LEADS

NONSPECIFIC ST-T CHANGE LATERAL LEADS

:

Confirmed by: Pablo Monteiro MD 18-Oct-2018 07:27:31

## 2018-10-18 NOTE — PDOC H&P
History of Present Illness


Admission Date/PCP: 


  10/17/18 22:10





  HELENA CODY MD





Patient complains of: Neck pain


History of Present Illness: 


BRANDI ROBLES is a 72 year old male with history of hypertension, 

hyperlipidemia and viral meningitis in 2014 comes to the emergency department 

complaining of neck pain and headaches.  Patient has been in our emergency 

department before with similar symptomatology and sent home with pain 

medications.  In the emergency department laboratory was sent and potassium 

came back up to 6.5.  Patient is at home on lisinopril which is not a new 

medication for him, denies taking any potassium supplements or any over-the-

counter vitamins.  Patient has very mild CKD stage III.


His main complaint and concern is his neck pain and he is unable to move his 

neck, CT of the neck was done on prior visit to the emergency department and 

shows chronic changes.


Denies fever, chills, nausea, vomiting, chest pain, shortness of breath, cough, 

abdominal pain, changes in his urine or bowel movements.





In the ED was given hyperkalemia cocktail with D50 with 10 units of insulin 

regular IV, 1 g of IV calcium gluconate, nebulizer treatments with albuterol.  

EKG with no peak T waves.








Past Medical History


Cardiac Medical History: Reports: Hyperlipidema, Hypertension


Pulmonary Medical History: Reports: Bronchitis


Endocrine Medical History: Reports: Diabetes Mellitus Type 2


Renal/ Medical History: Reports: Nephrolithiasis


Skin History Note: 





Agent orange exposure


Psychiatric Medical History: Reports: Post Traumatic Stress Disorder





Past Surgical History


Past Surgical History: Reports: Orthopedic Surgery - Left hand surgery





Social History


Lives with: Alone


Smoking Status: Former Smoker


Frequency of Alcohol Use: Occasional


Hx Recreational Drug Use: No


Hx Prescription Drug Abuse: No


Past Social History Note: 





Patient is independent with his ADLs, 2 daughters at the bedside.





Family History


Family History: Reviewed & Not Pertinent


Parental Family History Reviewed: Yes - Father with history of diabetes mellitus


Children Family History Reviewed: NA


Sibling(s) Family History Reviewed.: NA





Medication/Allergy


Home Medications: 








Amoxicillin Trihydrate [Amoxil 875 mg Tablet] 1 tab PO Q12 08/06/14 


Fluticasone Propionate [Flovent Diskus 50 mcg] 2 puff IH DAILY 08/06/14 


Glyburide [Diabeta 5 mg Tablet] 5 mg PO QAM 08/06/14 


Lisinopril/Hydrochlorothiazide [Lisinopril-Hctz 20-12.5 mg Tab] 1.5 each PO 

DAILY 08/06/14 


Simvastatin 40 mg PO DAILY 08/06/14 


Sitagliptin Phos/Metformin HCl [Janumet 50-1,000 Mg Tablet] 1 each PO BID 08/06/ 14 


Hydrocodone/Acetaminophen [Norco 5-325 mg Tablet] 1 tab PO Q6H #12 tablet 07/16/ 18 








Allergies/Adverse Reactions: 


 





No Known Allergies Allergy (Verified 10/17/18 18:48)


 











Review of Systems


Review of Systems: 





As outlined in the HPI, all others negative





Physical Exam


Vital Signs: 


 











Temp Pulse Resp BP Pulse Ox


 


 97.9 F   79   20   147/79 H  93 


 


 10/17/18 18:49  10/17/18 18:49  10/17/18 23:01  10/17/18 23:01  10/17/18 23:01











Additional comments: 





General appearance: Well-developed, obese, alert and cooperative, and appears 

to be in no acute distress


Head: Normocephalic


Eyes: PEERL, EOMI, vision is grossly intact.  Ears: External auditory canal and 

tympanic membranes clear, hearing grossly intact.  Nose: No nasal discharge.  

Throat: Oral cavity and pharynx normal.  No inflammation, swelling, exudate or 

lesions.


Neck: Neck stiff, with limited range of motion to right, left side, up or down.

  Tenderness to palpation in the para spinal cervical area.  No lymphadenopathy

, erythema or swelling.


Cardiac: Normal S1 and S2.  No S3, S4 or murmurs.  Rhythm is regular.  There is 

no peripheral edema, cyanosis or pallor.  Extremities are warm and well 

perfused.  Capillary refill is less than 2 seconds.  No carotid bruits.


Lungs: Clear to auscultation and percussion without rales, rhonchi, wheezing or 

diminished breath sounds.  Not using accessory muscles.


Abdomen: Positive bowel sounds.  Soft.  Nondistended, nontender.  No guarding 

or rebound.  No masses.  No hepatosplenomegaly


Extremities: No significant deformity or joint abnormality.  No edema.  

Peripheral pulses intact.  No varicosities.


Neurological: Cranial nerves II through XII grossly intact.  Strength and 

sensation symmetric and intact throughout.  Reflexes 2+ throughout.


Skin: Skin normal color, texture and turgor with no lesions or eruptions, warm 

and dry.


Psychiatric:  The mental examination revealed the patient was oriented to person

, place, and time.  The patient was able to demonstrate good judgment on recent

, without hallucinations, abnormal affect or abnormal behaviors.





Results


Laboratory Results: 





 











  10/17/18 10/17/18 10/17/18





  19:24 19:24 20:50


 


WBC    9.2


 


RBC    4.77


 


Hgb    12.6 L


 


Hct    38.6


 


MCV    81


 


MCH    26.4 L


 


MCHC    32.6


 


RDW    16.3 H


 


Plt Count    229


 


Seg Neutrophils %    70.3


 


Lymphocytes %    20.1


 


Monocytes %    8.0


 


Eosinophils %    1.4


 


Basophils %    0.2


 


Absolute Neutrophils    6.5


 


Absolute Lymphocytes    1.9


 


Absolute Monocytes    0.7


 


Absolute Eosinophils    0.1


 


Absolute Basophils    0.0


 


PT  13.5  


 


INR  0.98  


 


Sodium   


 


Potassium   


 


Chloride   


 


Carbon Dioxide   


 


Anion Gap   


 


BUN   


 


Creatinine   


 


Est GFR ( Amer)   


 


Est GFR (Non-Af Amer)   


 


Glucose   


 


Lactic Acid   1.9 


 


Calcium   


 


Total Bilirubin   


 


Direct Bilirubin   


 


AST   


 


ALT   


 


Alkaline Phosphatase   


 


Total Protein   


 


Albumin   














  10/17/18





  20:50


 


WBC 


 


RBC 


 


Hgb 


 


Hct 


 


MCV 


 


MCH 


 


MCHC 


 


RDW 


 


Plt Count 


 


Seg Neutrophils % 


 


Lymphocytes % 


 


Monocytes % 


 


Eosinophils % 


 


Basophils % 


 


Absolute Neutrophils 


 


Absolute Lymphocytes 


 


Absolute Monocytes 


 


Absolute Eosinophils 


 


Absolute Basophils 


 


PT 


 


INR 


 


Sodium  137.7


 


Potassium  6.5 H*


 


Chloride  104


 


Carbon Dioxide  24


 


Anion Gap  10


 


BUN  19


 


Creatinine  1.29 H


 


Est GFR ( Amer)  > 60


 


Est GFR (Non-Af Amer)  54 L


 


Glucose  167 H


 


Lactic Acid 


 


Calcium  9.5


 


Total Bilirubin  0.8


 


Direct Bilirubin  0.3


 


AST  26


 


ALT  23


 


Alkaline Phosphatase  66


 


Total Protein  7.6


 


Albumin  4.1








Impressions: 


 





Cervical Spine CT  10/17/18 19:12


IMPRESSION:  Degenerative disc disease, facet arthropathy.  Mild spondylosis.  

No acute findings.


 








Head CT  10/17/18 19:12


IMPRESSION:  MILD CHRONIC MICROVASCULAR ISCHEMIA. NO ACUTE IMAGING FINDINGS IN 

THE BRAIN.


EVIDENCE OF ACUTE STROKE: NO.


 








Chest X-Ray  10/17/18 19:15


IMPRESSION:  NO ACUTE RADIOGRAPHIC FINDING IN THE CHEST.


 














Assessment & Plan





- Diagnosis


(1) Hyperkalemia


Is this a current diagnosis for this admission?: Yes   


Plan: 


Laboratory sent in the emergency department shows serum potassium at 6.5, in 

the ED given 1 g of calcium gluconate, D50 with 10 units of IV insulin regular, 

IV Lasix and albuterol nebulizer treatments.  We will reassess potassium at 1 

in the morning and treat accordingly.  EKG does not show any peaked T waves.  

Will place lisinopril on hold.  Renal function is at his baseline.  Denies 

taking any potassium supplements at home or over-the-counter vitamins.








(2) HTN (hypertension)


Qualifiers: 


   Hypertension type: unspecified   Qualified Code(s): I10 - Essential (primary

) hypertension   


Is this a current diagnosis for this admission?: Yes   


Plan: 


Continue with home antihypertensive medications.








(3) Neck pain


Is this a current diagnosis for this admission?: Yes   


Plan: 


This is a chronic problem that apparently is triggered by stress and anxiety, 

has a CT of the neck done this year which shows chronic changes, in the past 

this has improved progressively with pain medications and muscle relaxant.  

Will resume his home cyclobenzaprine, I will place him on Norco as needed as 

during his last visit to the ED improve his symptoms and Tylenol as needed.  

Patient might benefit of neurosurgery evaluation as outpatient.


CT of the head done as the patient was complaining of headaches and came back 

negative for acute, shows mild chronic microvascular ischemia.








(4) Diabetes mellitus type 2 in obese


Is this a current diagnosis for this admission?: Yes   


Plan: 


For now we are going to place hold his metformin.  Accu-Cheks q. before meals 

and at bedtime, insulin lispro sliding scale and hypoglycemia protocol.








- Time


Time Spent: 30 to 50 Minutes

## 2018-10-19 VITALS — DIASTOLIC BLOOD PRESSURE: 76 MMHG | SYSTOLIC BLOOD PRESSURE: 150 MMHG

## 2018-10-19 LAB
ANION GAP SERPL CALC-SCNC: 10 MMOL/L (ref 5–19)
BUN SERPL-MCNC: 21 MG/DL (ref 7–20)
CALCIUM: 9 MG/DL (ref 8.4–10.2)
CHLORIDE SERPL-SCNC: 100 MMOL/L (ref 98–107)
CO2 SERPL-SCNC: 26 MMOL/L (ref 22–30)
GLUCOSE SERPL-MCNC: 269 MG/DL (ref 75–110)
POTASSIUM SERPL-SCNC: 5.5 MMOL/L (ref 3.6–5)
SODIUM SERPL-SCNC: 135.5 MMOL/L (ref 137–145)

## 2018-10-19 RX ADMIN — OXYCODONE AND ACETAMINOPHEN PRN TAB: 5; 325 TABLET ORAL at 00:21

## 2018-10-19 RX ADMIN — ACETAMINOPHEN PRN MG: 325 TABLET ORAL at 10:39

## 2018-10-19 RX ADMIN — TOBRAMYCIN AND DEXAMETHASONE SCH DROP: 3; 1 SUSPENSION/ DROPS OPHTHALMIC at 10:15

## 2018-10-19 RX ADMIN — OXYCODONE AND ACETAMINOPHEN PRN TAB: 5; 325 TABLET ORAL at 07:50

## 2018-10-19 RX ADMIN — LIDOCAINE SCH PATCH: 50 PATCH CUTANEOUS at 10:14

## 2018-10-19 RX ADMIN — SODIUM CHLORIDE PRN MLS/HR: 9 INJECTION, SOLUTION INTRAVENOUS at 03:51

## 2018-10-19 RX ADMIN — INSULIN LISPRO PRN UNIT: 100 INJECTION, SOLUTION INTRAVENOUS; SUBCUTANEOUS at 12:08

## 2018-10-19 RX ADMIN — ENOXAPARIN SODIUM SCH: 40 INJECTION SUBCUTANEOUS at 10:15

## 2018-10-19 RX ADMIN — INSULIN LISPRO PRN UNIT: 100 INJECTION, SOLUTION INTRAVENOUS; SUBCUTANEOUS at 07:51

## 2018-10-19 NOTE — PDOC DISCHARGE SUMMARY
General





- Admit/Disc Date/PCP


Admission Date/Primary Care Provider: 


  10/17/18 22:10





  HELENA CODY MD





Discharge Date: 10/19/18





- Discharge Diagnosis


(1) Hyperkalemia


Is this a current diagnosis for this admission?: Yes   


Summary: 


He has some chronically elevated potassium levels.  I looked back as far as 

2014 and it appears that his potassium typically run somewhere between 5.3 and 

5.7.  He was 5.5 at discharge.  He was on lisinopril and a low-dose of HCTZ.  I 

discontinued these medicines and changed him to a moderate dose of 

chlorthalidone.








(2) Hypomagnesemia


Is this a current diagnosis for this admission?: Yes   


Summary: 


This is also a chronic issue for him.  I have recommended that he take his 

magnesium supplement twice a day instead of once a day as he was taken it 

before.








(3) Neck pain


Is this a current diagnosis for this admission?: Yes   


Summary: 


CT the cervical spine shows loss of the normal lordotic architecture of the 

cervical spine, multilevel loss of disc space height, bone spurring, as well as 

multilevel facet arthropathy and bony hypertrophy.  I told him that he should 

get a referral to a spine specialist just to hear the opinion, but I am not 

convinced that surgery is going to be able to help him.  Right now he just has 

some periodic discomfort and limited range of motion, but he does not have any 

neuropathic symptoms.








- Additional Information


Resuscitation Status: Full Code


Discharge Diet: Diabetic


Discharge Activity: Activity As Tolerated


Prescriptions: 


Chlorthalidone [Chlorthalidone 25 mg Tablet] 1 tab PO DAILY #30 tablet


Home Medications: 








Simvastatin 40 mg PO DAILY 08/06/14 


Aspirin [Aspirin EC] 81 mg PO DAILY 10/18/18 


Cetirizine HCl [Zyrtec 10 mg Tablet] 1 tab PO DAILY 10/18/18 


Cyclobenzaprine HCl [Flexeril 10 mg Tablet] 10 mg PO BID 10/18/18 


Insulin Glargine,Hum.rec.anlog [Toujeo Solostar] 120 unit SQ DAILY 10/18/18 


Insulin Lispro [Humalog Kwikpen U-100] 15 unit SQ TID 10/18/18 


Magnesium Oxide [Mag-Ox 400 mg Tablet] 400 mg PO DAILY 10/18/18 


Metformin HCl [Metformin HCl ER] 1,000 mg PO BID 10/18/18 


Tobramycin Sulfate/Dexameth [Tobradex Oph Drops 2.5 ml] 1 drop .ROUTE BID 10/18/

18 


Chlorthalidone [Chlorthalidone 25 mg Tablet] 1 tab PO DAILY #30 tablet 10/19/18 











History of Present Illness


History of Present Illness: 


BRANDI ROBLES is a 72 year old male with history of hypertension, 

hyperlipidemia and viral meningitis in 2014 comes to the emergency department 

complaining of neck pain and headaches.  Patient has been in our emergency 

department before with similar symptomatology and sent home with pain 

medications.  In the emergency department laboratory was sent and potassium 

came back up to 6.5.  Patient is at home on lisinopril which is not a new 

medication for him, denies taking any potassium supplements or any over-the-

counter vitamins.  Patient has very mild CKD stage III.


His main complaint and concern is his neck pain and he is unable to move his 

neck, CT of the neck was done on prior visit to the emergency department and 

shows chronic changes.


Denies fever, chills, nausea, vomiting, chest pain, shortness of breath, cough, 

abdominal pain, changes in his urine or bowel movements.





In the ED was given hyperkalemia cocktail with D50 with 10 units of insulin 

regular IV, 1 g of IV calcium gluconate, nebulizer treatments with albuterol.  

EKG with no peak T waves.








Hospital Course


Hospital Course: 





He was given some Kayexalate and some fluids and his potassium came down some.  

It seems to stabilize and arrange a 5.3-5.7 as noted above.  I switched up his 

blood pressure medications as noted above in the hopes that this will help 

attenuate any further increase in his potassium.  His magnesium was low which 

we replaced.  I recommended that he take his magnesium supplement twice a day.  

His neck was evaluated as noted above.  His labs and examination were 

reassuring and he was discharged today in good condition.





Physical Exam


Vital Signs: 


 











Temp Pulse Resp BP Pulse Ox


 


 97.8 F   75   20   150/76 H  95 


 


 10/19/18 14:36  10/19/18 14:36  10/19/18 14:36  10/19/18 14:36  10/19/18 14:36








 Intake & Output











 10/18/18 10/19/18 10/20/18





 06:59 06:59 06:59


 


Intake Total  2940 534


 


Output Total 300 200 


 


Balance -300 2740 534








General appearance: PRESENT: no acute distress, cooperative, disheveled, 

morbidly obese


Neck exam: PRESENT: tenderness - Mild point tenderness with palpation.  ABSENT: 

full ROM


Respiratory exam: PRESENT: clear to auscultation karen, unlabored.  ABSENT: chest 

wall tenderness, rales, rhonchi, tachypnea, wheezes


Cardiovascular exam: PRESENT: RRR, +S1, +S2.  ABSENT: diastolic murmur, 

systolic murmur


GI/Abdominal exam: PRESENT: normal bowel sounds, soft.  ABSENT: guarding, 

rebound, tenderness


Extremities exam: ABSENT: clubbing, pedal edema


Musculoskeletal exam: PRESENT: ambulatory, normal inspection.  ABSENT: deformity


Neurological exam: PRESENT: alert, awake, oriented to person, oriented to place

, oriented to time


Psychiatric exam: PRESENT: appropriate affect, normal mood





Results


Laboratory Results: 


 





 10/19/18 09:44 





 











  10/19/18





  09:44


 


Sodium  135.5 L


 


Potassium  5.5 H


 


Chloride  100


 


Carbon Dioxide  26


 


Anion Gap  10


 


BUN  21 H


 


Creatinine  1.17


 


Est GFR ( Amer)  > 60


 


Est GFR (Non-Af Amer)  > 60


 


Glucose  269 H


 


Calcium  9.0


 


Magnesium  1.6











Impressions: 


 





Cervical Spine CT  10/17/18 19:12


IMPRESSION:  Degenerative disc disease, facet arthropathy.  Mild spondylosis.  

No acute findings.


 








Head CT  10/17/18 19:12


IMPRESSION:  MILD CHRONIC MICROVASCULAR ISCHEMIA. NO ACUTE IMAGING FINDINGS IN 

THE BRAIN.


EVIDENCE OF ACUTE STROKE: NO.


 








Chest X-Ray  10/17/18 19:15


IMPRESSION:  NO ACUTE RADIOGRAPHIC FINDING IN THE CHEST.


 














Qualifiers





- *


PATIENT BEING DISCHARGED WITH ANY OF THE FOLLOWING DIAGNOSIS: No

## 2020-01-03 ENCOUNTER — HOSPITAL ENCOUNTER (EMERGENCY)
Dept: HOSPITAL 62 - ER | Age: 75
Discharge: HOME | End: 2020-01-03
Payer: MEDICARE

## 2020-01-03 VITALS — DIASTOLIC BLOOD PRESSURE: 93 MMHG | SYSTOLIC BLOOD PRESSURE: 164 MMHG

## 2020-01-03 DIAGNOSIS — Z87.891: ICD-10-CM

## 2020-01-03 DIAGNOSIS — I10: ICD-10-CM

## 2020-01-03 DIAGNOSIS — R06.02: ICD-10-CM

## 2020-01-03 DIAGNOSIS — R60.0: Primary | ICD-10-CM

## 2020-01-03 DIAGNOSIS — E11.9: ICD-10-CM

## 2020-01-03 LAB
ADD MANUAL DIFF: NO
ALBUMIN SERPL-MCNC: 4.4 G/DL (ref 3.5–5)
ALP SERPL-CCNC: 77 U/L (ref 38–126)
ANION GAP SERPL CALC-SCNC: 15 MMOL/L (ref 5–19)
AST SERPL-CCNC: 27 U/L (ref 17–59)
BASOPHILS # BLD AUTO: 0 10^3/UL (ref 0–0.2)
BASOPHILS NFR BLD AUTO: 0.1 % (ref 0–2)
BILIRUB DIRECT SERPL-MCNC: 0.2 MG/DL (ref 0–0.4)
BILIRUB SERPL-MCNC: 0.6 MG/DL (ref 0.2–1.3)
BUN SERPL-MCNC: 19 MG/DL (ref 7–20)
CALCIUM: 9.1 MG/DL (ref 8.4–10.2)
CHLORIDE SERPL-SCNC: 102 MMOL/L (ref 98–107)
CO2 SERPL-SCNC: 25 MMOL/L (ref 22–30)
EOSINOPHIL # BLD AUTO: 0.2 10^3/UL (ref 0–0.6)
EOSINOPHIL NFR BLD AUTO: 3.2 % (ref 0–6)
ERYTHROCYTE [DISTWIDTH] IN BLOOD BY AUTOMATED COUNT: 15.9 % (ref 11.5–14)
GLUCOSE SERPL-MCNC: 191 MG/DL (ref 75–110)
HCT VFR BLD CALC: 36.2 % (ref 37.9–51)
HGB BLD-MCNC: 11.9 G/DL (ref 13.5–17)
LYMPHOCYTES # BLD AUTO: 1.5 10^3/UL (ref 0.5–4.7)
LYMPHOCYTES NFR BLD AUTO: 25.9 % (ref 13–45)
MCH RBC QN AUTO: 27 PG (ref 27–33.4)
MCHC RBC AUTO-ENTMCNC: 32.8 G/DL (ref 32–36)
MCV RBC AUTO: 82 FL (ref 80–97)
MONOCYTES # BLD AUTO: 0.6 10^3/UL (ref 0.1–1.4)
MONOCYTES NFR BLD AUTO: 9.3 % (ref 3–13)
NEUTROPHILS # BLD AUTO: 3.7 10^3/UL (ref 1.7–8.2)
NEUTS SEG NFR BLD AUTO: 61.5 % (ref 42–78)
PLATELET # BLD: 205 10^3/UL (ref 150–450)
POTASSIUM SERPL-SCNC: 4.5 MMOL/L (ref 3.6–5)
PROT SERPL-MCNC: 7.8 G/DL (ref 6.3–8.2)
RBC # BLD AUTO: 4.4 10^6/UL (ref 4.35–5.55)
TOTAL CELLS COUNTED % (AUTO): 100 %
WBC # BLD AUTO: 6 10^3/UL (ref 4–10.5)

## 2020-01-03 PROCEDURE — 71046 X-RAY EXAM CHEST 2 VIEWS: CPT

## 2020-01-03 PROCEDURE — 36415 COLL VENOUS BLD VENIPUNCTURE: CPT

## 2020-01-03 PROCEDURE — 80053 COMPREHEN METABOLIC PANEL: CPT

## 2020-01-03 PROCEDURE — 93010 ELECTROCARDIOGRAM REPORT: CPT

## 2020-01-03 PROCEDURE — 99285 EMERGENCY DEPT VISIT HI MDM: CPT

## 2020-01-03 PROCEDURE — 84484 ASSAY OF TROPONIN QUANT: CPT

## 2020-01-03 PROCEDURE — 85025 COMPLETE CBC W/AUTO DIFF WBC: CPT

## 2020-01-03 PROCEDURE — 93005 ELECTROCARDIOGRAM TRACING: CPT

## 2020-01-03 NOTE — ER DOCUMENT REPORT
ED General





- General


Chief Complaint: Shortness Of Breath


Stated Complaint: ARM PAIN, SHORTNESS OF BREATH


Time Seen by Provider: 01/03/20 11:33


Primary Care Provider: 


HELENA CODY MD [ACTIVE STAFF] - Follow up as needed


TRAVEL OUTSIDE OF THE [place] IN LAST 30 DAYS: No





- Related Data


Allergies/Adverse Reactions: 


                                        





No Known Allergies Allergy (Verified 01/03/20 11:30)


   








Home Medications: Brilenta





Past Medical History





- Social History


Smoking Status: Former Smoker


Chew tobacco use (# tins/day): No


Drug Abuse: None


Family History: Reviewed & Not Pertinent


Patient has suicidal ideation: No


Patient has homicidal ideation: No





- Past Medical History


Cardiac Medical History: Reports: Hx Hypercholesterolemia, Hx Hypertension


Pulmonary Medical History: Reports: Hx Bronchitis


Endocrine Medical History: Reports: Hx Diabetes Mellitus Type 2


Renal/ Medical History: Denies: Hx Peritoneal Dialysis


Psychiatric Medical History: Reports: Hx Post Traumatic Stress Disorder


Past Surgical History: Reports: Hx Orthopedic Surgery - Left hand surgery





Physical Exam





- Vital signs


Vitals: 


                                        











Temp Pulse Resp BP Pulse Ox


 


 97.7 F   62   18   169/86 H  97 


 


 01/03/20 10:57  01/03/20 10:57  01/03/20 10:57  01/03/20 10:57  01/03/20 10:57














- Notes


Notes: 





Patient presents emerge department complaining of shortness of breath after 

going off for the past several days.  Says he only gets it at night when he lays

down.  Does not have it during the day and does not have it when he walks 

around.  Associated with little bit of nonproductive cough that is been 

improving since the end of thanks end of November.  No fevers with this no chest

pain or palpitations.  And does not get short of breath walking around.  Also 

reports he has had some increasing swelling of the lower extremities for the 

past several days.  The seem to get better in the morning.  Patient reports that

he was admitted to the hospital at the end of Thanksgiving with a non-STEMI had 

a stent placed at that time he had CHF and questionable pneumonia was discharged

home on antibiotics.  Has not any follow-up visits.  At the time of his 

presentation only have a shortness of breath he had no chest pain





Patient also reports an episode of left arm pain last night his in his shoulder.

 Lasted for just a brief period of time.  Not associated with any nausea 

vomiting diaphoresis shortness of breath chest pain or palpitations.  

Intermittent pain like this since his surgery.  Denies any trauma or falls with 

this.





Past medical history significant for hypertension diabetes CHF coronary artery 

disease with a non-STEMI.  Also has a history of sleep apnea reports has not 

been sleeping well.  He is scheduled to see someone next week to get fitted for 

a mask





Social history does not smoke or drink at all.





Medications on Brilinta





Review of systems pertinent positives and negatives in HPI otherwise all the 

systems were reviewed and acutely negative








PHYSICIAN EXAM -vital signs are noted triage note and note from triage reviewed


 


GENERAL: Well-appearing, well-nourished and in __no acute distress____


 


HEAD: Atraumatic, normocephalic.


 


EYES: Pupils equal round and reactive to light, extraocular movements intact, 

sclera anicteric, conjunctiva are normal.


 


ENT: nares patent, oropharynx clear without exudates.  Moist mucous membranes.


 


NECK:  supple without lymphadenopathy


 


LUNGS: Breath sounds clear to auscultation bilaterally and equal.  No wheezes 

rales or rhonchi.


 


HEART: Regular rate and rhythm without murmurs no JVD


 


ABDOMEN: Soft, nontender, normoactive bowel sounds. 


 


EXTREMITIES: No deformity he has +2 edema to mid calf.  Is no palpable cords of 

the upper extremity clavicle and AC joint nontender with full range of motion


 


NEUROLOGICAL: No focal neurological deficits. Moves all extremities 

spontaneously and on command.


 


PSYCH: Normal mood, normal affect.


 


SKIN: Warm, Dry, normal turgor, no rashes or lesions noted.





BACK-nontender in the midline





Differential diagnosis CHF right heart failure pneumonia





Course





- Re-evaluation


Re-evalutation: 





01/03/20 20:13


ED patient is remained stable chest x-ray was ordered from triage which was 

ordered by me because his troponin was elevated we ordered a repeat he is 

remained stable here.  Remain clear with good O2 sats and no chest pain





Medical decision making patient presents with orthopnea extremity edema.  He 

really has notes of CHF and I wonder if part of this is related to his sleep 

apnea.  Does admit to swelling of extremities though.  He looks well and he can 

be discharged home and certainly no evidence to suggest he is having an acute 

coronary syndrome at this point he will be given a short course of Lasix and 

potassium.  He has a follow-up appoint with family doctor next week.  Was 

advised will need his blood pressure rechecked again.





Was noted that his initial troponin was upper limits of normal but the second 

has come down like this may be a downward trend from his previous heart surgery





At this time there is no indication for admission.  I have discussed the 

findings with patient/family with return precautions and follow-up 

recommendations.  Verbal discharge instructions given at the bedside and 

opportunity for questions given.  Medication warnings were given if indicated. 

Patient is in agreement with this plan and has verbalized understanding of 

return precautions and the need for primary care follow-up as directed..





- Vital Signs


Vital signs: 


                                        











Temp Pulse Resp BP Pulse Ox


 


 97.7 F   76   21 H  164/93 H  96 


 


 01/03/20 10:57  01/03/20 19:29  01/03/20 19:29  01/03/20 19:29  01/03/20 19:29














- Laboratory


Result Diagrams: 


                                 01/03/20 11:54





                                 01/03/20 11:54


Laboratory results interpreted by me: 


                                        











  01/03/20 01/03/20





  11:54 11:54


 


Hgb  11.9 L 


 


Hct  36.2 L 


 


RDW  15.9 H 


 


Est GFR (MDRD) Non-Af   58 L


 


Glucose   191 H














- EKG Interpretation by Me


Additional EKG results interpreted by me: 





01/03/20 20:15


His initial EKG shows a normal sinus rhythm some nonspecific ST wave changes 

anterior and lateral leads is new from previous EKG from October 2018 however 

since October he has had the MI his repeat EKG is unchanged from the first





Discharge





- Discharge


Clinical Impression: 


 Lower extremity edema





Hypertension


Qualifiers:


 Hypertension type: unspecified Qualified Code(s): I10 - Essential (primary) 

hypertension





Disposition: HOME, SELF-CARE


Additional Instructions: 





Please review the discharge instructions, they will tell you about your 

disease/injury and what you need to return to the ED for





Return to the ED if you feel worse or can follow-up with your family doctor





Currently develop any chest pain or shortness of breath with exertion





Follow-up with your family doctor next week





Your blood pressure was elevated today needs to be rechecked again in 1 to 2 

weeks to determine if need to be on medication or have your medications 

adjusted.  Untreated hypertension can cause heart attack stroke and kidney 

failure


Prescriptions: 


Furosemide [Lasix 20 mg Tablet] 20 mg PO QAM #10 tablet


Potassium Chloride 20 meq PO ASDIR PRN #10 tab.er.[id number]


 PRN Reason: 


Forms:  Elevated Blood Pressure


Referrals: 


HELENA CODY MD [ACTIVE STAFF] - Follow up as needed

## 2020-01-03 NOTE — EKG REPORT
SEVERITY:- ABNORMAL ECG -

SINUS RHYTHM

BORDERLINE LEFT AXIS DEVIATION

ABNORMAL T, CONSIDER ISCHEMIA, LATERAL LEADS

:

Confirmed by: Pablo Monteiro MD 03-Jan-2020 19:27:46

## 2020-01-03 NOTE — RADIOLOGY REPORT (SQ)
EXAM DESCRIPTION:  CHEST 2 VIEWS



COMPLETED DATE/TIME:  1/3/2020 6:08 pm



REASON FOR STUDY:  SOB



COMPARISON:  10/17/2018



TECHNIQUE:  Frontal and lateral radiographic views of the chest acquired.



NUMBER OF VIEWS:  Two view.



LIMITATIONS:  None.



FINDINGS:  LUNGS AND PLEURA: No pneumothorax. No consolidation or pleural effusion.

MEDIASTINUM AND HILAR STRUCTURES: Stable.

HEART AND VASCULAR STRUCTURES: Stable.

BONES: No acute findings.

HARDWARE: None in the chest.

OTHER: No other significant finding.



IMPRESSION:  NO ACUTE FINDINGS.



TECHNICAL DOCUMENTATION:  JOB ID:  [id number]

[id number]

 2011 [employer]- All Rights Reserved



Reading location - IP/workstation name: [email or web address]

## 2020-01-03 NOTE — EKG REPORT
SEVERITY:- ABNORMAL ECG -

SINUS RHYTHM

BORDERLINE LEFT AXIS DEVIATION

NONSPECIFIC T ABNORMALITIES, LATERAL LEADS

:

Confirmed by: Pablo Monteiro MD 03-Jan-2020 19:27:14

## 2020-01-03 NOTE — ER DOCUMENT REPORT
ED Medical Screen (RME)





- General


Chief Complaint: Shortness Of Breath


Stated Complaint: ARM PAIN, SHORTNESS OF BREATH


Time Seen by Provider: 01/03/20 11:33


Primary Care Provider: 


HELENA CODY MD [Primary Care Provider] - Follow up as needed


Notes: 





Patient is a 74-year-old male who presents to the emergency department with a 

chief complaint of shortness of breath.  Patient states that he has had 

shortness of breath for the past few days.  Patient also was also working in the

yard recently.  Patient does have a history of an NSTEMI and pneumonia recently.

 He was seen by his primary care provider and had a repeat chest x-ray, which 

was normal, but patient states that he still feels congested in his mid upper 

chest.  He is currently on blood thinners.  Patient denies any chest pain.





Exam: S1-S2.  Clear lung sounds throughout.





I have greeted and performed a rapid initial assessment of this patient.  A 

comprehensive ED assessment and evaluation of the patient, analysis of test 

results and completion of medical decision making process will be conducted by 

an additional ED providers.


TRAVEL OUTSIDE OF THE [place] IN LAST 30 DAYS: No





- Related Data


Allergies/Adverse Reactions: 


                                        





No Known Allergies Allergy (Verified 01/03/20 11:30)


   








Home Medications: Brilenta





Past Medical History





- Social History


Chew tobacco use (# tins/day): No


Drug Abuse: None





- Past Medical History


Cardiac Medical History: Reports: Hx Hypercholesterolemia, Hx Hypertension


Pulmonary Medical History: Reports: Hx Bronchitis


Endocrine Medical History: Reports: Hx Diabetes Mellitus Type 2


Renal/ Medical History: Denies: Hx Peritoneal Dialysis


Psychiatric Medical History: Reports: Hx Post Traumatic Stress Disorder


Past Surgical History: Reports: Hx Orthopedic Surgery - Left hand surgery





Physical Exam





- Vital signs


Vitals: 


                                        











Temp Pulse Resp BP Pulse Ox


 


 97.7 F   62   18   169/86 H  97 


 


 01/03/20 10:57  01/03/20 10:57  01/03/20 10:57  01/03/20 10:57  01/03/20 10:57














Course





- Vital Signs


Vital signs: 


                                        











Temp Pulse Resp BP Pulse Ox


 


 97.7 F   62   18   169/86 H  97 


 


 01/03/20 10:57  01/03/20 10:57  01/03/20 10:57  01/03/20 10:57  01/03/20 10:57














Doctor's Discharge





- Discharge


Referrals: 


HELENA CODY MD [Primary Care Provider] - Follow up as needed

## 2020-04-09 ENCOUNTER — HOSPITAL ENCOUNTER (INPATIENT)
Dept: HOSPITAL 62 - ER | Age: 75
LOS: 2 days | Discharge: HOME | DRG: 683 | End: 2020-04-11
Attending: INTERNAL MEDICINE | Admitting: INTERNAL MEDICINE
Payer: MEDICARE

## 2020-04-09 DIAGNOSIS — Z79.82: ICD-10-CM

## 2020-04-09 DIAGNOSIS — E66.9: ICD-10-CM

## 2020-04-09 DIAGNOSIS — K11.20: ICD-10-CM

## 2020-04-09 DIAGNOSIS — Z82.49: ICD-10-CM

## 2020-04-09 DIAGNOSIS — I25.10: ICD-10-CM

## 2020-04-09 DIAGNOSIS — Z95.5: ICD-10-CM

## 2020-04-09 DIAGNOSIS — I11.0: ICD-10-CM

## 2020-04-09 DIAGNOSIS — I50.9: ICD-10-CM

## 2020-04-09 DIAGNOSIS — L03.115: ICD-10-CM

## 2020-04-09 DIAGNOSIS — E87.5: ICD-10-CM

## 2020-04-09 DIAGNOSIS — E11.9: ICD-10-CM

## 2020-04-09 DIAGNOSIS — N17.9: Primary | ICD-10-CM

## 2020-04-09 DIAGNOSIS — Z79.4: ICD-10-CM

## 2020-04-09 DIAGNOSIS — E86.0: ICD-10-CM

## 2020-04-09 LAB
A TYPE INFLUENZA AG: NEGATIVE
ADD MANUAL DIFF: NO
ALBUMIN SERPL-MCNC: 4.3 G/DL (ref 3.5–5)
ALP SERPL-CCNC: 99 U/L (ref 38–126)
ANION GAP SERPL CALC-SCNC: 14 MMOL/L (ref 5–19)
APPEARANCE UR: (no result)
APTT PPP: YELLOW S
AST SERPL-CCNC: 27 U/L (ref 17–59)
B INFLUENZA AG: NEGATIVE
BASE EXCESS BLDV CALC-SCNC: -1.4 MMOL/L
BASOPHILS # BLD AUTO: 0 10^3/UL (ref 0–0.2)
BASOPHILS NFR BLD AUTO: 0.2 % (ref 0–2)
BILIRUB DIRECT SERPL-MCNC: 0.1 MG/DL (ref 0–0.4)
BILIRUB SERPL-MCNC: 0.9 MG/DL (ref 0.2–1.3)
BILIRUB UR QL STRIP: NEGATIVE
BUN SERPL-MCNC: 39 MG/DL (ref 7–20)
CALCIUM: 9.1 MG/DL (ref 8.4–10.2)
CHLORIDE SERPL-SCNC: 101 MMOL/L (ref 98–107)
CO2 SERPL-SCNC: 23 MMOL/L (ref 22–30)
CREAT UR-MCNC: 194.8 MG/DL (ref 22–328)
EOSINOPHIL # BLD AUTO: 0.1 10^3/UL (ref 0–0.6)
EOSINOPHIL NFR BLD AUTO: 1.2 % (ref 0–6)
ERYTHROCYTE [DISTWIDTH] IN BLOOD BY AUTOMATED COUNT: 15.8 % (ref 11.5–14)
GLUCOSE SERPL-MCNC: 125 MG/DL (ref 75–110)
GLUCOSE UR STRIP-MCNC: NEGATIVE MG/DL
HCO3 BLDV-SCNC: 23.7 MMOL/L (ref 20–32)
HCT VFR BLD CALC: 33.3 % (ref 37.9–51)
HGB BLD-MCNC: 11.2 G/DL (ref 13.5–17)
INR PPP: 1.19
KETONES UR STRIP-MCNC: NEGATIVE MG/DL
LYMPHOCYTES # BLD AUTO: 2 10^3/UL (ref 0.5–4.7)
LYMPHOCYTES NFR BLD AUTO: 20.4 % (ref 13–45)
MCH RBC QN AUTO: 27.5 PG (ref 27–33.4)
MCHC RBC AUTO-ENTMCNC: 33.6 G/DL (ref 32–36)
MCV RBC AUTO: 82 FL (ref 80–97)
MONOCYTES # BLD AUTO: 1.2 10^3/UL (ref 0.1–1.4)
MONOCYTES NFR BLD AUTO: 12.7 % (ref 3–13)
NEUTROPHILS # BLD AUTO: 6.3 10^3/UL (ref 1.7–8.2)
NEUTS SEG NFR BLD AUTO: 65.5 % (ref 42–78)
NITRITE UR QL STRIP: NEGATIVE
NT PRO BNP: 247 PG/ML (ref ?–450)
OSMOLALITY,URINE: 560 MOSM/KG (ref 300–900)
PCO2 BLDV: 41.7 MMHG (ref 35–63)
PH BLDV: 7.37 [PH] (ref 7.3–7.42)
PH UR STRIP: 5 [PH] (ref 5–9)
PLATELET # BLD: 372 10^3/UL (ref 150–450)
POTASSIUM SERPL-SCNC: 5.2 MMOL/L (ref 3.6–5)
PROT SERPL-MCNC: 8.1 G/DL (ref 6.3–8.2)
PROT UR STRIP-MCNC: 100 MG/DL
PROTHROMBIN TIME: 15.2 SEC (ref 11.4–15.4)
RBC # BLD AUTO: 4.07 10^6/UL (ref 4.35–5.55)
SP GR UR STRIP: 1.02
TOTAL CELLS COUNTED % (AUTO): 100 %
TROPONIN I SERPL-MCNC: 0.02 NG/ML
URINE SODIUM: 88 MMOL/L (ref 30–90)
UROBILINOGEN UR-MCNC: NEGATIVE MG/DL (ref ?–2)
WBC # BLD AUTO: 9.6 10^3/UL (ref 4–10.5)

## 2020-04-09 PROCEDURE — 87880 STREP A ASSAY W/OPTIC: CPT

## 2020-04-09 PROCEDURE — 84133 ASSAY OF URINE POTASSIUM: CPT

## 2020-04-09 PROCEDURE — 87040 BLOOD CULTURE FOR BACTERIA: CPT

## 2020-04-09 PROCEDURE — 85610 PROTHROMBIN TIME: CPT

## 2020-04-09 PROCEDURE — 93005 ELECTROCARDIOGRAM TRACING: CPT

## 2020-04-09 PROCEDURE — 82962 GLUCOSE BLOOD TEST: CPT

## 2020-04-09 PROCEDURE — 81001 URINALYSIS AUTO W/SCOPE: CPT

## 2020-04-09 PROCEDURE — 87070 CULTURE OTHR SPECIMN AEROBIC: CPT

## 2020-04-09 PROCEDURE — 84300 ASSAY OF URINE SODIUM: CPT

## 2020-04-09 PROCEDURE — 83880 ASSAY OF NATRIURETIC PEPTIDE: CPT

## 2020-04-09 PROCEDURE — 84484 ASSAY OF TROPONIN QUANT: CPT

## 2020-04-09 PROCEDURE — 83935 ASSAY OF URINE OSMOLALITY: CPT

## 2020-04-09 PROCEDURE — 93010 ELECTROCARDIOGRAM REPORT: CPT

## 2020-04-09 PROCEDURE — 82570 ASSAY OF URINE CREATININE: CPT

## 2020-04-09 PROCEDURE — 83605 ASSAY OF LACTIC ACID: CPT

## 2020-04-09 PROCEDURE — 86735 MUMPS ANTIBODY: CPT

## 2020-04-09 PROCEDURE — 80048 BASIC METABOLIC PNL TOTAL CA: CPT

## 2020-04-09 PROCEDURE — 83036 HEMOGLOBIN GLYCOSYLATED A1C: CPT

## 2020-04-09 PROCEDURE — 96372 THER/PROPH/DIAG INJ SC/IM: CPT

## 2020-04-09 PROCEDURE — 36415 COLL VENOUS BLD VENIPUNCTURE: CPT

## 2020-04-09 PROCEDURE — 96360 HYDRATION IV INFUSION INIT: CPT

## 2020-04-09 PROCEDURE — 83735 ASSAY OF MAGNESIUM: CPT

## 2020-04-09 PROCEDURE — 99285 EMERGENCY DEPT VISIT HI MDM: CPT

## 2020-04-09 PROCEDURE — 85025 COMPLETE CBC W/AUTO DIFF WBC: CPT

## 2020-04-09 PROCEDURE — 85652 RBC SED RATE AUTOMATED: CPT

## 2020-04-09 PROCEDURE — 87804 INFLUENZA ASSAY W/OPTIC: CPT

## 2020-04-09 PROCEDURE — 71045 X-RAY EXAM CHEST 1 VIEW: CPT

## 2020-04-09 PROCEDURE — 80053 COMPREHEN METABOLIC PANEL: CPT

## 2020-04-09 PROCEDURE — 96361 HYDRATE IV INFUSION ADD-ON: CPT

## 2020-04-09 PROCEDURE — 82803 BLOOD GASES ANY COMBINATION: CPT

## 2020-04-09 PROCEDURE — 70486 CT MAXILLOFACIAL W/O DYE: CPT

## 2020-04-09 NOTE — RADIOLOGY REPORT (SQ)
EXAM DESCRIPTION: 



XR CHEST 1 VIEW



COMPLETED DATE/TME:  04/09/2020 19:55



CLINICAL HISTORY: 



74 years, Male, cp



COMPARISON:

Multiple priors, most recent from 1/3/2020



NUMBER OF VIEWS:

One



TECHNIQUE:

Single frontal view of the chest was obtained portably



LIMITATIONS:

None.



FINDINGS:



Cardiac and mediastinal contours are stable. Lung volumes are

low. No focal consolidation, pleural effusion, or pneumothorax is

evident.



IMPRESSION:



Negative low lung volume study.

 



copyright 2011 [employer]- All Rights Reserved

## 2020-04-09 NOTE — ER DOCUMENT REPORT
ED General





- General


Chief Complaint: Dizziness


Stated Complaint: DIZZINESS


Time Seen by Provider: 04/09/20 19:01


Information source: Patient


TRAVEL OUTSIDE OF THE [place] IN LAST 30 DAYS: No





- HPI


Notes: 





74-year-old male history of diabetes, hypertension, CHF, CAD with distant stent 

presents with approximately 1 week of throat pain and left upper jaw pain and 

anterior neck pain and approximately 4 days of productive nonbloody cough with 1

day of lightheadedness.  Was evaluated by PCP via telehealth visit and started 

on penicillin for presumed strep throat but symptoms have not improved.  Because

of throat pain patient has not been taking adequate p.o. over the last several 

days.  Patient says that he had a "pinch "of momentary pain in his chest at 

approximately 4 PM today at rest without any associated symptoms which did not 

remind him of previous anginal pain.  Patient lives in house with 8 people, no 

known sick contacts.  Patient has had subjective fever over the past week.  

Patient denies exertional chest pain, lower extremity edema, recent travel.





- Related Data


Allergies/Adverse Reactions: 


                                        





No Known Allergies Allergy (Verified 01/03/20 11:30)


   








Home Medications: lisinopril, metformin, aspirin





Past Medical History





- General


Information source: Patient





- Social History


Smoking Status: Unknown if Ever Smoked


Family History: Reviewed & Not Pertinent


Patient has suicidal ideation: No


Patient has homicidal ideation: No





- Past Medical History


Cardiac Medical History: Reports: Hx Congestive Heart Failure, Hx Coronary 

Artery Disease, Hx Hypercholesterolemia, Hx Hypertension


Pulmonary Medical History: Reports: Hx Bronchitis


Endocrine Medical History: Reports: Hx Diabetes Mellitus Type 2


Renal/ Medical History: Denies: Hx Peritoneal Dialysis


Psychiatric Medical History: Reports: Hx Post Traumatic Stress Disorder


Past Surgical History: Reports: Hx Orthopedic Surgery - Left hand surgery





Review of Systems





- Review of Systems


Notes: 





REVIEW OF SYSTEMS:


CONSTITUTIONAL : +fever +chills 


EENT: Denies recent cold/sinus symptoms, +throat pain


CARDIOVASCULAR:  +chest pain, -EMILI


RESPIRATORY:  +cough, denies shortness of breath.


GASTROINTESTINAL:  Denies abdominal pain, nausea/vomiting.


GENITOURINARY:  Denies difficulty urinating, painful urination.


MUSCULOSKELETAL:  +neck pain, back pain.


SKIN:   Denies rash or skin lesions.


HEMATOLOGIC :   Denies easy bruising or bleeding.


LYMPHATIC:  +swollen, enlarged glands.


NEUROLOGICAL:  Denies headache, denies change in gait.


PSYCHIATRIC:  Denies anxiety or stress or depression.





Physical Exam





- Vital signs


Vitals: 


                                        











Temp Pulse Resp BP Pulse Ox


 


 97.9 F   91   20   114/50 L  97 


 


 04/09/20 17:01  04/09/20 17:01  04/09/20 17:01  04/09/20 17:01  04/09/20 17:01








PHYSICAL EXAMINATION:


 


GENERAL: Well-appearing, well-nourished and in no acute distress.


 


HEAD: Atraumatic, normocephalic.


 


EYES: Pupils equal round and appropriate constriction, sclera anicteric, 

conjunctiva are normal.


 


ENT: nares patent, mildly dry mucous membranes. Tenderness over left parotid 

gland without overlying skin changes. No trismus, no soft palate swelling 

tenderness or discharge. moderate tonsillar edema with exudates, voice normal, 

speaking in full clear sentences, no drooling, normal uvula.


 


NECK: Normal range of motion, supple with tender anterior cervical lymphadeno

vanessa. 


 


LUNGS: Breath sounds clear to auscultation bilaterally and equal.  No wheezes 

rales or rhonchi.


 


HEART: Regular rate and rhythm without murmurs


 


ABDOMEN: Soft, nontender, no guarding, no masses, no CVAT


 


EXTREMITIES: Normal range of motion, no pitting or edema.  No cyanosis.


 


NEUROLOGICAL: Awake, alert, conversing appropriately, moves all extremities 

spontaneously.


 


PSYCH: Normal mood, normal affect.


 


SKIN: Warm, Dry, normal turgor, no rashes or lesions noted.





Course





- Re-evaluation


Re-evalutation: 





04/09/20 20:39


Pt hypotensive as per ems prior to 500 ml bolus given pta, pt borderline BP in 

ED given setting of hypertension hx. most likely 2/2 very poor po intake since 

onset of pharyngitis, r/o peritonsillar abscess, periodontal abscess, sepsis, 

acs (unlikely but will r/o with serial trop given hx). plan: ekg trop ct face 

pen benzathine lactate fluids cxr reassess.  No signs of impending airway 

obstruction or respiratory compromise, exam and presentation not consistent with

Marcial's angina


04/10/20 21:00


Patient found to have acute renal injury very likely secondary to dehydration 

from poor p.o. intake.  Hypotension resolved with IV hydration, blood pressure 

remains stable in the ED after initial bolus.  Given no clinical signs of fluid 

overload, no lower extremity edema, no overload on chest x-ray, patient given 

additional conservative hydration.  Admitted to hospitalist.





- Vital Signs


Vital signs: 


                                        











Temp Pulse Resp BP Pulse Ox


 


 97.8 F   96   16   129/58 H  98 


 


 04/10/20 01:01  04/10/20 01:01  04/10/20 01:01  04/10/20 01:01  04/10/20 01:01














- Laboratory


Result Diagrams: 


                                 04/09/20 17:04





                                 04/09/20 17:04


Laboratory results interpreted by me: 


                                        











  04/09/20 04/09/20 04/09/20





  17:04 17:04 20:50


 


RBC  4.07 L  


 


Hgb  11.2 L  


 


Hct  33.3 L  


 


RDW  15.8 H  


 


Potassium   5.2 H 


 


BUN   39 H 


 


Creatinine   2.56 H 


 


Est GFR ( Amer)   30 L 


 


Est GFR (MDRD) Non-Af   25 L 


 


Glucose   125 H 


 


Urine Protein    100 H


 


Urine Ascorbic Acid    20 H














Discharge





- Discharge


Clinical Impression: 


 Dehydration





Pharyngitis


Qualifiers:


 Pharyngitis/tonsillitis etiology: unspecified etiology Qualified Code(s): J02.9

- Acute pharyngitis, unspecified





Acute renal failure


Qualifiers:


 Acute renal failure type: unspecified Qualified Code(s): N17.9 - Acute kidney 

failure, unspecified





Condition: Fair


Disposition: ADMITTED AS INPATIENT


Admitting Provider: Henok (Hospitalist)


Unit Admitted: Telemetry

## 2020-04-09 NOTE — EKG REPORT
SEVERITY:- ABNORMAL ECG -

SINUS RHYTHM

LEFT AXIS DEVIATION

BORDERLINE R WAVE PROGRESSION, ANTERIOR LEADS

ABNORMAL T, CONSIDER ISCHEMIA, LATERAL LEADS

:

Confirmed by: Sondra Soares MD 09-Apr-2020 21:21:22

## 2020-04-09 NOTE — RADIOLOGY REPORT (SQ)
INDICATION: r/o tracking periodontal/left parotid abscess.



COMPARISON:  None



CORRELATION:  None



TECHNIQUE: Noncontrast spiral axial CT images were obtained of

the facial bones.  Multiplanar reconstructions.



This exam was performed according to our departmental

dose-optimization program, which includes automated exposure

control, adjustment of the mA and/or kV according to patient size

and/or use of iterative reconstruction techniques.



FINDINGS: Examination is not adequate for evaluation of

intracranial contents.



No acute displaced fracture seen of the facial bones. Alignment

is anatomic. The paranasal sinuses are grossly clear. Mastoid air

cells are clear. Orbits and eyeballs are unremarkable..  Poor

dentition. Several missing teeth. Significant artifact from

dental prostheses and amalgam. Osteoarthritis





IMPRESSION:

No acute bony injury is seen to the face.  Poor dentition.

Significant artifact from dental prostheses and amalgam.

## 2020-04-10 LAB
ADD MANUAL DIFF: NO
ANION GAP SERPL CALC-SCNC: 9 MMOL/L (ref 5–19)
BASOPHILS # BLD AUTO: 0 10^3/UL (ref 0–0.2)
BASOPHILS NFR BLD AUTO: 0.3 % (ref 0–2)
BUN SERPL-MCNC: 51 MG/DL (ref 7–20)
CALCIUM: 8.2 MG/DL (ref 8.4–10.2)
CHLORIDE SERPL-SCNC: 104 MMOL/L (ref 98–107)
CO2 SERPL-SCNC: 23 MMOL/L (ref 22–30)
EOSINOPHIL # BLD AUTO: 0.1 10^3/UL (ref 0–0.6)
EOSINOPHIL NFR BLD AUTO: 1.4 % (ref 0–6)
ERYTHROCYTE [DISTWIDTH] IN BLOOD BY AUTOMATED COUNT: 16 % (ref 11.5–14)
GLUCOSE SERPL-MCNC: 156 MG/DL (ref 75–110)
HCT VFR BLD CALC: 29.3 % (ref 37.9–51)
HGB BLD-MCNC: 9.5 G/DL (ref 13.5–17)
LYMPHOCYTES # BLD AUTO: 1.7 10^3/UL (ref 0.5–4.7)
LYMPHOCYTES NFR BLD AUTO: 30.5 % (ref 13–45)
MCH RBC QN AUTO: 26.9 PG (ref 27–33.4)
MCHC RBC AUTO-ENTMCNC: 32.6 G/DL (ref 32–36)
MCV RBC AUTO: 83 FL (ref 80–97)
MONOCYTES # BLD AUTO: 0.9 10^3/UL (ref 0.1–1.4)
MONOCYTES NFR BLD AUTO: 16 % (ref 3–13)
NEUTROPHILS # BLD AUTO: 2.9 10^3/UL (ref 1.7–8.2)
NEUTS SEG NFR BLD AUTO: 51.8 % (ref 42–78)
PLATELET # BLD: 279 10^3/UL (ref 150–450)
POTASSIUM SERPL-SCNC: 4.9 MMOL/L (ref 3.6–5)
RBC # BLD AUTO: 3.54 10^6/UL (ref 4.35–5.55)
TOTAL CELLS COUNTED % (AUTO): 100 %
WBC # BLD AUTO: 5.6 10^3/UL (ref 4–10.5)

## 2020-04-10 RX ADMIN — DOXYCYCLINE HYCLATE SCH MG: 100 TABLET ORAL at 21:24

## 2020-04-10 RX ADMIN — INSULIN LISPRO SCH: 100 INJECTION, SOLUTION INTRAVENOUS; SUBCUTANEOUS at 07:58

## 2020-04-10 RX ADMIN — TICAGRELOR SCH MG: 90 TABLET ORAL at 21:24

## 2020-04-10 RX ADMIN — SODIUM CHLORIDE PRN MLS/HR: 9 INJECTION, SOLUTION INTRAVENOUS at 06:46

## 2020-04-10 RX ADMIN — INSULIN LISPRO SCH: 100 INJECTION, SOLUTION INTRAVENOUS; SUBCUTANEOUS at 17:24

## 2020-04-10 RX ADMIN — TICAGRELOR SCH MG: 90 TABLET ORAL at 14:53

## 2020-04-10 RX ADMIN — HEPARIN SODIUM SCH UNIT: 5000 INJECTION, SOLUTION INTRAVENOUS; SUBCUTANEOUS at 21:24

## 2020-04-10 RX ADMIN — SODIUM CHLORIDE PRN MLS/HR: 9 INJECTION, SOLUTION INTRAVENOUS at 01:16

## 2020-04-10 RX ADMIN — INSULIN LISPRO SCH UNIT: 100 INJECTION, SOLUTION INTRAVENOUS; SUBCUTANEOUS at 12:40

## 2020-04-10 RX ADMIN — HEPARIN SODIUM SCH UNIT: 5000 INJECTION, SOLUTION INTRAVENOUS; SUBCUTANEOUS at 05:17

## 2020-04-10 RX ADMIN — DOXYCYCLINE HYCLATE SCH MG: 100 TABLET ORAL at 11:42

## 2020-04-10 RX ADMIN — DOCUSATE SODIUM SCH MG: 100 CAPSULE, LIQUID FILLED ORAL at 10:50

## 2020-04-10 RX ADMIN — HEPARIN SODIUM SCH UNIT: 5000 INJECTION, SOLUTION INTRAVENOUS; SUBCUTANEOUS at 14:53

## 2020-04-10 RX ADMIN — SODIUM CHLORIDE PRN MLS/HR: 9 INJECTION, SOLUTION INTRAVENOUS at 17:00

## 2020-04-10 RX ADMIN — DOCUSATE SODIUM SCH MG: 100 CAPSULE, LIQUID FILLED ORAL at 17:25

## 2020-04-10 NOTE — PDOC H&P
History of Present Illness


Admission Date/PCP: 


  04/09/20 22:52





  JANELLE SHARMA MD





Patient complains of: Left submandibular pain


History of Present Illness: 


BRANDI ROBLES is a 74 year old male with a past medical history of diabetes, 

hypertension, coronary artery disease with stent, congestive heart failure with 

unknown ejection fraction.  He presents with 1 week of left submandibular pain 

and right foot cellulitis diagnosed by primary care and was prescribed Bactrim. 

Patient has had 3 days of Bactrim with improvement of right foot erythema but 

persistent  left submandibular pain.  Exam reveals left submandibular pain to 

palpation, CT unremarkable for dental abscess though obscured by dental filling.

 There is no leukocytosis however he is found to have acute renal failure and 

hyperkalemia.  He receives IV fluids and referred to the hospitalist for 

admission.


He denies fever cough nausea vomiting or infectious contacts for COVID.  





Past Medical History


Cardiac Medical History: Reports: Congestive Heart Failure, Coronary Artery 

Disease, Hyperlipidema, Hypertension


Pulmonary Medical History: Reports: Bronchitis


Endocrine Medical History: Reports: Diabetes Mellitus Type 2


Psychiatric Medical History: Reports: Post Traumatic Stress Disorder





Past Surgical History


Past Surgical History: Reports: Orthopedic Surgery - Left hand surgery





Social History


Information Source: Patient, Emergency Med Personnel, [facility] Records


Lives with: Spouse/Significant other


Smoking Status: Unknown if Ever Smoked


Frequency of Alcohol Use: Occasional


Hx Recreational Drug Use: No


Drugs: None


Hx Prescription Drug Abuse: No





- Advance Directive


Resuscitation Status: Full Code





Family History


Family History: Hypertension


Parental Family History Reviewed: Yes


Children Family History Reviewed: Yes


Sibling(s) Family History Reviewed.: Yes





Medication/Allergy


Home Medications: 








Simvastatin 40 mg PO DAILY 08/06/14 


Aspirin [Aspirin EC] 81 mg PO DAILY 10/18/18 


Cetirizine HCl [Zyrtec 10 mg Tablet] 1 tab PO DAILY 10/18/18 


Cyclobenzaprine HCl [Flexeril 10 mg Tablet] 10 mg PO BID 10/18/18 


Insulin Glargine,Hum.rec.anlog [Toujeo Solostar] 120 unit SQ DAILY 10/18/18 


Insulin Lispro [Humalog Kwikpen U-100] 15 unit SQ TID 10/18/18 


Magnesium Oxide [Mag-Ox 400 mg Tablet] 400 mg PO DAILY 10/18/18 


Metformin HCl [Metformin HCl ER] 1,000 mg PO BID 10/18/18 


Tobramycin Sulfate/Dexameth [Tobradex Oph Drops 2.5 ml] 1 drop .ROUTE BID 

10/18/18 


Chlorthalidone [Chlorthalidone 25 mg Tablet] 1 tab PO DAILY #30 tablet 10/19/18 


Furosemide [Lasix 20 mg Tablet] 20 mg PO QAM #10 tablet 01/03/20 


Potassium Chloride 20 meq PO ASDIR PRN #10 tab.er.prt 01/03/20 








Allergies/Adverse Reactions: 


                                        





No Known Allergies Allergy (Verified 01/03/20 11:30)


   











Review of Systems


Constitutional: PRESENT: as per HPI, anorexia, headache(s).  ABSENT: chills, 

fever(s), weight gain, weight loss


Eyes: ABSENT: visual disturbances


Ears: ABSENT: hearing changes


Nose, Mouth, and Throat: PRESENT: headache(s), other - Pain with palpation to 

the left submandibular area.


Cardiovascular: ABSENT: chest pain, dyspnea on exertion, edema, orthropnea, 

palpitations


Respiratory: ABSENT: cough, hemoptysis


Gastrointestinal: ABSENT: abdominal pain, constipation, diarrhea, hematemesis, 

hematochezia, nausea, vomiting


Genitourinary: ABSENT: dysuria, hematuria


Musculoskeletal: PRESENT: other - Right foot pain.  ABSENT: joint swelling


Integumentary: ABSENT: rash, wounds


Neurological: ABSENT: abnormal gait, abnormal speech, confusion, dizziness, foca

l weakness, syncope


Psychiatric: ABSENT: anxiety, depression, homidical ideation, suicidal ideation


Endocrine: ABSENT: cold intolerance, heat intolerance, polydipsia, polyuria


Hematologic/Lymphatic: ABSENT: easy bleeding, easy bruising





Physical Exam


Vital Signs: 


                                        











Temp Pulse Resp BP Pulse Ox


 


 97.3 F   87   16   116/64   100 


 


 04/10/20 03:58  04/10/20 03:58  04/10/20 03:58  04/10/20 03:58  04/10/20 03:58








                                 Intake & Output











 04/08/20 04/09/20 04/10/20





 11:59 11:59 11:59


 


Intake Total   500


 


Balance   500


 


Weight   96.162 kg











General appearance: PRESENT: no acute distress, well-developed, well-nourished


Head exam: PRESENT: atraumatic, normocephalic


Eye exam: PRESENT: conjunctiva pink, EOMI, PERRLA.  ABSENT: scleral icterus


Ear exam: PRESENT: normal external ear exam


Mouth exam: PRESENT: moist, tongue midline


Teeth exam: PRESENT: dental caries, poor dentation.  ABSENT: dental tenderness


Neck exam: PRESENT: lymphadenopathy - 1.5 x 0.5 cm tender lymphadenopathy left 

submandibular area.  ABSENT: carotid bruit, JVD, thyromegaly


Respiratory exam: PRESENT: clear to auscultation karen.  ABSENT: rales, rhonchi, 

wheezes


Cardiovascular exam: PRESENT: RRR.  ABSENT: diastolic murmur, rubs, systolic 

murmur


Pulses: PRESENT: normal dorsalis pedis pul


Vascular exam: PRESENT: normal capillary refill


GI/Abdominal exam: PRESENT: normal bowel sounds, soft.  ABSENT: distended, 

guarding, mass, organolmegaly, rebound, tenderness


Rectal exam: PRESENT: deferred


Extremities exam: PRESENT: full ROM, other - Slight erythema to the dorsal 

aspect of the right foot greatly receded from prior markings..  ABSENT: calf 

tenderness, clubbing, joint swelling, pedal edema


Neurological exam: PRESENT: alert, awake, oriented to person, oriented to place,

oriented to time, oriented to situation, CN II-XII grossly intact.  ABSENT: 

motor sensory deficit


Psychiatric exam: PRESENT: appropriate affect, normal mood.  ABSENT: homicidal 

ideation, suicidal ideation


Skin exam: PRESENT: dry, intact, warm.  ABSENT: cyanosis, rash





Results


Laboratory Results: 


                                        





                                 04/09/20 17:04 





                                 04/10/20 04:18 





                                        











  04/09/20 04/09/20 04/09/20





  17:04 17:04 17:49


 


WBC  9.6  


 


RBC  4.07 L  


 


Hgb  11.2 L  


 


Hct  33.3 L  


 


MCV  82  


 


MCH  27.5  


 


MCHC  33.6  


 


RDW  15.8 H  


 


Plt Count  372  


 


Seg Neutrophils %  65.5  


 


VBG pH    7.37


 


VBG pCO2    41.7


 


VBG HCO3    23.7


 


VBG Base Excess    -1.4


 


Sodium   138.3 


 


Potassium   5.2 H 


 


Chloride   101 


 


Carbon Dioxide   23 


 


Anion Gap   14 


 


BUN   39 H 


 


Creatinine   2.56 H 


 


Est GFR ( Amer)   30 L 


 


Glucose   125 H 


 


Lactic Acid   


 


Calcium   9.1 


 


Total Bilirubin   0.9 


 


AST   27 


 


Alkaline Phosphatase   99 


 


Total Protein   8.1 


 


Albumin   4.3 


 


Urine Color   


 


Urine Appearance   


 


Urine pH   


 


Ur Specific Gravity   


 


Urine Protein   


 


Urine Glucose (UA)   


 


Urine Ketones   


 


Urine Blood   


 


Urine Nitrite   


 


Ur Leukocyte Esterase   


 


Urine WBC (Auto)   


 


Urine RBC (Auto)   


 


Urine Osmolality   














  04/09/20 04/09/20 04/09/20





  17:49 20:50 20:50


 


WBC   


 


RBC   


 


Hgb   


 


Hct   


 


MCV   


 


MCH   


 


MCHC   


 


RDW   


 


Plt Count   


 


Seg Neutrophils %   


 


VBG pH   


 


VBG pCO2   


 


VBG HCO3   


 


VBG Base Excess   


 


Sodium   


 


Potassium   


 


Chloride   


 


Carbon Dioxide   


 


Anion Gap   


 


BUN   


 


Creatinine   


 


Est GFR (African Amer)   


 


Glucose   


 


Lactic Acid  2.0  


 


Calcium   


 


Total Bilirubin   


 


AST   


 


Alkaline Phosphatase   


 


Total Protein   


 


Albumin   


 


Urine Color    YELLOW


 


Urine Appearance    CLOUDY


 


Urine pH    5.0


 


Ur Specific Gravity    1.019


 


Urine Protein    100 H


 


Urine Glucose (UA)    NEGATIVE


 


Urine Ketones    NEGATIVE


 


Urine Blood    NEGATIVE


 


Urine Nitrite    NEGATIVE


 


Ur Leukocyte Esterase    NEGATIVE


 


Urine WBC (Auto)    4


 


Urine RBC (Auto)    9


 


Urine Osmolality   560 














  04/09/20 04/10/20 04/10/20





  21:33 04:18 04:18


 


WBC   


 


RBC   


 


Hgb   


 


Hct   


 


MCV   


 


MCH   


 


MCHC   


 


RDW   


 


Plt Count   


 


Seg Neutrophils %   


 


VBG pH   


 


VBG pCO2   


 


VBG HCO3   


 


VBG Base Excess   


 


Sodium    136.4 L


 


Potassium    4.9


 


Chloride    104


 


Carbon Dioxide    23


 


Anion Gap    9


 


BUN    51 H


 


Creatinine    2.79 H


 


Est GFR ( Amer)    27 L


 


Glucose    156 H


 


Lactic Acid  1.6  1.2 


 


Calcium    8.2 L


 


Total Bilirubin   


 


AST   


 


Alkaline Phosphatase   


 


Total Protein   


 


Albumin   


 


Urine Color   


 


Urine Appearance   


 


Urine pH   


 


Ur Specific Gravity   


 


Urine Protein   


 


Urine Glucose (UA)   


 


Urine Ketones   


 


Urine Blood   


 


Urine Nitrite   


 


Ur Leukocyte Esterase   


 


Urine WBC (Auto)   


 


Urine RBC (Auto)   


 


Urine Osmolality   








                                        











  04/09/20





  17:04


 


Troponin I  0.022


 


NT-Pro-B Natriuret Pep  247











Impressions: 


                                        





Chest X-Ray  04/09/20 19:55


IMPRESSION:


 


Negative low lung volume study.


 


 


copyright 2011 [employer]- All Rights Reserved


 








Facial Bones CT  04/09/20 19:56


IMPRESSION:


No acute bony injury is seen to the face.  Poor dentition.


Significant artifact from dental prostheses and amalgam.


 














Assessment and Plan





- Diagnosis


(1) Acute renal failure


Qualifiers: 


   Acute renal failure type: unspecified   Qualified Code(s): N17.9 - Acute 

kidney failure, unspecified   


Is this a current diagnosis for this admission?: Yes   


Plan: 


Likely secondary to Bactrim, Bactrim withheld, IV fluid challenge, follow-up 

chemistry








(2) Salivary gland adenitis


Is this a current diagnosis for this admission?: Yes   


Plan: 


Versus dental abscess not revealed by CT.  Symptomatic management, trial 

doxycycline








(3) Diabetes mellitus


Qualifiers: 


   Diabetes mellitus type: type 2   Diabetes mellitus long term insulin use: 

without long term use   Diabetes mellitus complication status: without 

complication   Qualified Code(s): E11.9 - Type 2 diabetes mellitus without 

complications   


Is this a current diagnosis for this admission?: Yes   


Plan: 


Discontinue metformin while in renal failure, Lantus and Humalog sliding scale 

ordered.








(4) HTN (hypertension)


Qualifiers: 


   Hypertension type: unspecified   Qualified Code(s): I10 - Essential (primary)

hypertension   


Is this a current diagnosis for this admission?: Yes   


Plan: 


Outpatient regiment








(5) Hyperkalemia


Is this a current diagnosis for this admission?: Yes   


Plan: 


Most likely secondary to Bactrim, IV fluid challenge, follow-up chemistry, 

consider Kayexalate or lactulose.








- Time


Time Spent with patient: 25-34 minutes





- Inpatient Certification


Medical Necessity: Need Close Monitoring Due to Risk of Patient Decompensation

## 2020-04-10 NOTE — PDOC PROGRESS REPORT
Subjective


Progress Note for:: 04/10/20


Subjective:: 





74 year old male with a past medical history of diabetes, hypertension, coronary

artery disease with stent, congestive heart failure with unknown ejection 

fraction.  He presents with 1 week of left submandibular pain and right foot 

cellulitis diagnosed by primary care and was prescribed Bactrim.  Patient has 

had 3 days of Bactrim with improvement of right foot erythema but persistent  

left submandibular pain.  Exam reveals left submandibular pain to palpation, CT 

unremarkable for dental abscess though obscured by dental filling.  There is no 

leukocytosis however he is found to have acute renal failure and hyperkalemia.  

He receives IV fluids and referred to the hospitalist for admission.


He denies fever cough nausea vomiting or infectious contacts for COVID.  








[birth year]54-jalc-old male with history of hypertension, coronary artery disease 

with stent placement, congestive heart failure with peripheral acute kidney 

injury and hyperkalemia.  Repeat potassium is 4.9.  Patient is on doxycycline 

for questionable right foot cellulitis.  Plan is to continue IV fluids repeat 

the labs tomorrow.  Comfortably in the bed communicating well.


Reason For Visit: 


ARF,DM,HYPERKALEMIA








Physical Exam


Vital Signs: 


                                        











Temp Pulse Resp BP Pulse Ox


 


 98.7 F   80   18   141/78 H  95 


 


 04/10/20 07:56  04/10/20 07:56  04/10/20 07:56  04/10/20 07:56  04/10/20 07:56








                                 Intake & Output











 04/09/20 04/10/20 04/11/20





 06:59 06:59 06:59


 


Intake Total  1500 


 


Balance  1500 


 


Weight  96.2 kg 











General appearance: PRESENT: no acute distress, obese


Head exam: PRESENT: atraumatic


Eye exam: PRESENT: PERRLA


Mouth exam: PRESENT: neck supple


Teeth exam: PRESENT: poor dentation


Neck exam: ABSENT: carotid bruit, JVD, lymphadenopathy, thyromegaly


Respiratory exam: PRESENT: decreased breath sounds


Cardiovascular exam: PRESENT: RRR.  ABSENT: diastolic murmur, rubs, systolic 

murmur


Vascular exam: PRESENT: normal capillary refill


GI/Abdominal exam: PRESENT: normal bowel sounds, soft.  ABSENT: distended, 

guarding, mass, organolmegaly, rebound, tenderness


Rectal exam: PRESENT: deferred


Extremities exam: PRESENT: full ROM.  ABSENT: calf tenderness, clubbing, pedal 

edema


Neurological exam: PRESENT: alert


Psychiatric exam: PRESENT: appropriate affect, normal mood.  ABSENT: homicidal 

ideation, suicidal ideation





Results


Laboratory Results: 


                                        





                                 04/10/20 06:43 





                                 04/10/20 04:18 





                                        











  04/09/20 04/09/20 04/09/20





  17:04 17:04 17:49


 


WBC  9.6  


 


RBC  4.07 L  


 


Hgb  11.2 L  


 


Hct  33.3 L  


 


MCV  82  


 


MCH  27.5  


 


MCHC  33.6  


 


RDW  15.8 H  


 


Plt Count  372  


 


Seg Neutrophils %  65.5  


 


VBG pH    7.37


 


VBG pCO2    41.7


 


VBG HCO3    23.7


 


VBG Base Excess    -1.4


 


Sodium   138.3 


 


Potassium   5.2 H 


 


Chloride   101 


 


Carbon Dioxide   23 


 


Anion Gap   14 


 


BUN   39 H 


 


Creatinine   2.56 H 


 


Est GFR ( Amer)   30 L 


 


Glucose   125 H 


 


Lactic Acid   


 


Calcium   9.1 


 


Total Bilirubin   0.9 


 


AST   27 


 


Alkaline Phosphatase   99 


 


Total Protein   8.1 


 


Albumin   4.3 


 


Urine Color   


 


Urine Appearance   


 


Urine pH   


 


Ur Specific Gravity   


 


Urine Protein   


 


Urine Glucose (UA)   


 


Urine Ketones   


 


Urine Blood   


 


Urine Nitrite   


 


Ur Leukocyte Esterase   


 


Urine WBC (Auto)   


 


Urine RBC (Auto)   


 


Urine Osmolality   














  04/09/20 04/09/20 04/09/20





  17:49 20:50 20:50


 


WBC   


 


RBC   


 


Hgb   


 


Hct   


 


MCV   


 


MCH   


 


MCHC   


 


RDW   


 


Plt Count   


 


Seg Neutrophils %   


 


VBG pH   


 


VBG pCO2   


 


VBG HCO3   


 


VBG Base Excess   


 


Sodium   


 


Potassium   


 


Chloride   


 


Carbon Dioxide   


 


Anion Gap   


 


BUN   


 


Creatinine   


 


Est GFR (African Amer)   


 


Glucose   


 


Lactic Acid  2.0  


 


Calcium   


 


Total Bilirubin   


 


AST   


 


Alkaline Phosphatase   


 


Total Protein   


 


Albumin   


 


Urine Color    YELLOW


 


Urine Appearance    CLOUDY


 


Urine pH    5.0


 


Ur Specific Gravity    1.019


 


Urine Protein    100 H


 


Urine Glucose (UA)    NEGATIVE


 


Urine Ketones    NEGATIVE


 


Urine Blood    NEGATIVE


 


Urine Nitrite    NEGATIVE


 


Ur Leukocyte Esterase    NEGATIVE


 


Urine WBC (Auto)    4


 


Urine RBC (Auto)    9


 


Urine Osmolality   560 














  04/09/20 04/10/20 04/10/20





  21:33 04:18 04:18


 


WBC   


 


RBC   


 


Hgb   


 


Hct   


 


MCV   


 


MCH   


 


MCHC   


 


RDW   


 


Plt Count   


 


Seg Neutrophils %   


 


VBG pH   


 


VBG pCO2   


 


VBG HCO3   


 


VBG Base Excess   


 


Sodium    136.4 L


 


Potassium    4.9


 


Chloride    104


 


Carbon Dioxide    23


 


Anion Gap    9


 


BUN    51 H


 


Creatinine    2.79 H


 


Est GFR ( Amer)    27 L


 


Glucose    156 H


 


Lactic Acid  1.6  1.2 


 


Calcium    8.2 L


 


Total Bilirubin   


 


AST   


 


Alkaline Phosphatase   


 


Total Protein   


 


Albumin   


 


Urine Color   


 


Urine Appearance   


 


Urine pH   


 


Ur Specific Gravity   


 


Urine Protein   


 


Urine Glucose (UA)   


 


Urine Ketones   


 


Urine Blood   


 


Urine Nitrite   


 


Ur Leukocyte Esterase   


 


Urine WBC (Auto)   


 


Urine RBC (Auto)   


 


Urine Osmolality   














  04/10/20





  06:43


 


WBC  5.6


 


RBC  3.54 L


 


Hgb  9.5 L


 


Hct  29.3 L


 


MCV  83


 


MCH  26.9 L


 


MCHC  32.6


 


RDW  16.0 H


 


Plt Count  279


 


Seg Neutrophils %  51.8


 


VBG pH 


 


VBG pCO2 


 


VBG HCO3 


 


VBG Base Excess 


 


Sodium 


 


Potassium 


 


Chloride 


 


Carbon Dioxide 


 


Anion Gap 


 


BUN 


 


Creatinine 


 


Est GFR (African Amer) 


 


Glucose 


 


Lactic Acid 


 


Calcium 


 


Total Bilirubin 


 


AST 


 


Alkaline Phosphatase 


 


Total Protein 


 


Albumin 


 


Urine Color 


 


Urine Appearance 


 


Urine pH 


 


Ur Specific Gravity 


 


Urine Protein 


 


Urine Glucose (UA) 


 


Urine Ketones 


 


Urine Blood 


 


Urine Nitrite 


 


Ur Leukocyte Esterase 


 


Urine WBC (Auto) 


 


Urine RBC (Auto) 


 


Urine Osmolality 








                                        











  04/09/20





  17:04


 


Troponin I  0.022


 


NT-Pro-B Natriuret Pep  247











Impressions: 


                                        





Chest X-Ray  04/09/20 19:55


IMPRESSION:


 


Negative low lung volume study.


 


 


copyright 2011 [employer]- All Rights Reserved


 








Facial Bones CT  04/09/20 19:56


IMPRESSION:


No acute bony injury is seen to the face.  Poor dentition.


Significant artifact from dental prostheses and amalgam.


 














Assessment and Plan





- Diagnosis


(1) Acute renal failure


Qualifiers: 


   Acute renal failure type: unspecified   Qualified Code(s): N17.9 - Acute 

kidney failure, unspecified   


Is this a current diagnosis for this admission?: Yes   


Plan: 


Likely secondary to Bactrim, Bactrim withheld, IV fluid challenge, follow-up 

chemistry








4/10/2020-patient's baseline creatinine is 1.21 on admission serum creatinine is

2.59 it was sent to 2.8 today.  Remains on hold onto plan to continue IV fluids 

and plan is to recheck the labs tomorrow.  Patient has history of underlying 

CKD.








(2) Salivary gland adenitis


Is this a current diagnosis for this admission?: Yes   


Plan: 


Versus dental abscess not revealed by CT.  Symptomatic management, trial 

doxycycline





4/10/2020-CT of the facial bones was done no evidence of abscess seen.  Plan is 

to continue p.o. doxycycline at this moment.








(3) Diabetes mellitus


Qualifiers: 


   Diabetes mellitus type: type 2   Diabetes mellitus long term insulin use: 

without long term use   Diabetes mellitus complication status: without 

complication   Qualified Code(s): E11.9 - Type 2 diabetes mellitus without 

complications   


Is this a current diagnosis for this admission?: No   


Plan: 


Discontinue metformin while in renal failure, Lantus and Humalog sliding scale 

ordered.








4/10/2020-patient has history of type 2 diabetes mellitus on long-term insulin  

Plan to check for hemoglobin A1c and he was started on insulin sliding scale 

before meals and at bedtime.  Diet exercise weight loss lifestyle modifications 

discussed with the patient.








(4) HTN (hypertension)


Qualifiers: 


   Hypertension type: unspecified   Qualified Code(s): I10 - Essential (primary)

hypertension   


Is this a current diagnosis for this admission?: No   


Plan: 


Outpatient regiment





4/10/2020-patient blood pressure is 116/64 today he is on chlorthalidone and 

furosemide at home plan is to hold medications at this time.








(5) Hyperkalemia


Is this a current diagnosis for this admission?: Yes   


Plan: 


Most likely secondary to Bactrim, IV fluid challenge, follow-up chemistry, 

consider Kayexalate or lactulose.





4/10/2020-admitted with a serum potassium of five-point 2 repeat potassium is 

4.9 today.  Patient is on potassium supplementations at home though 

supplementations are on hold.  Hyperkalemia may be secondary to MALCOLM.








(6) Obesity (BMI 30.0-34.9)


Is this a current diagnosis for this admission?: No   


Plan: 


4/10/2020-patient's BMI is more than 32 diet exercise lifestyle modifications 

discussed with the patient.

## 2020-04-11 VITALS — DIASTOLIC BLOOD PRESSURE: 62 MMHG | SYSTOLIC BLOOD PRESSURE: 125 MMHG

## 2020-04-11 LAB
ADD MANUAL DIFF: NO
ALBUMIN SERPL-MCNC: 3.6 G/DL (ref 3.5–5)
ALP SERPL-CCNC: 92 U/L (ref 38–126)
ANION GAP SERPL CALC-SCNC: 13 MMOL/L (ref 5–19)
AST SERPL-CCNC: 27 U/L (ref 17–59)
BASOPHILS # BLD AUTO: 0 10^3/UL (ref 0–0.2)
BASOPHILS NFR BLD AUTO: 0.3 % (ref 0–2)
BILIRUB DIRECT SERPL-MCNC: 0.4 MG/DL (ref 0–0.4)
BILIRUB SERPL-MCNC: 0.6 MG/DL (ref 0.2–1.3)
BUN SERPL-MCNC: 34 MG/DL (ref 7–20)
CALCIUM: 8.4 MG/DL (ref 8.4–10.2)
CHLORIDE SERPL-SCNC: 106 MMOL/L (ref 98–107)
CO2 SERPL-SCNC: 17 MMOL/L (ref 22–30)
EOSINOPHIL # BLD AUTO: 0.1 10^3/UL (ref 0–0.6)
EOSINOPHIL NFR BLD AUTO: 1.8 % (ref 0–6)
ERYTHROCYTE [DISTWIDTH] IN BLOOD BY AUTOMATED COUNT: 16 % (ref 11.5–14)
GLUCOSE SERPL-MCNC: 160 MG/DL (ref 75–110)
HCT VFR BLD CALC: 29.5 % (ref 37.9–51)
HGB BLD-MCNC: 9.8 G/DL (ref 13.5–17)
LYMPHOCYTES # BLD AUTO: 1.5 10^3/UL (ref 0.5–4.7)
LYMPHOCYTES NFR BLD AUTO: 23.1 % (ref 13–45)
MCH RBC QN AUTO: 27.3 PG (ref 27–33.4)
MCHC RBC AUTO-ENTMCNC: 33.3 G/DL (ref 32–36)
MCV RBC AUTO: 82 FL (ref 80–97)
MONOCYTES # BLD AUTO: 0.7 10^3/UL (ref 0.1–1.4)
MONOCYTES NFR BLD AUTO: 11.8 % (ref 3–13)
NEUTROPHILS # BLD AUTO: 4 10^3/UL (ref 1.7–8.2)
NEUTS SEG NFR BLD AUTO: 63 % (ref 42–78)
PLATELET # BLD: 295 10^3/UL (ref 150–450)
POTASSIUM SERPL-SCNC: 5 MMOL/L (ref 3.6–5)
PROT SERPL-MCNC: 7.1 G/DL (ref 6.3–8.2)
RBC # BLD AUTO: 3.6 10^6/UL (ref 4.35–5.55)
TOTAL CELLS COUNTED % (AUTO): 100 %
WBC # BLD AUTO: 6.3 10^3/UL (ref 4–10.5)

## 2020-04-11 RX ADMIN — HEPARIN SODIUM SCH UNIT: 5000 INJECTION, SOLUTION INTRAVENOUS; SUBCUTANEOUS at 05:33

## 2020-04-11 RX ADMIN — SODIUM CHLORIDE PRN MLS/HR: 9 INJECTION, SOLUTION INTRAVENOUS at 01:22

## 2020-04-11 RX ADMIN — INSULIN LISPRO SCH UNIT: 100 INJECTION, SOLUTION INTRAVENOUS; SUBCUTANEOUS at 07:39

## 2020-04-11 RX ADMIN — DOXYCYCLINE HYCLATE SCH MG: 100 TABLET ORAL at 09:18

## 2020-04-11 RX ADMIN — MAGNESIUM SULFATE IN DEXTROSE SCH MLS/HR: 10 INJECTION, SOLUTION INTRAVENOUS at 06:47

## 2020-04-11 RX ADMIN — TICAGRELOR SCH MG: 90 TABLET ORAL at 09:18

## 2020-04-11 RX ADMIN — MAGNESIUM SULFATE IN DEXTROSE SCH MLS/HR: 10 INJECTION, SOLUTION INTRAVENOUS at 07:51

## 2020-04-11 RX ADMIN — DOCUSATE SODIUM SCH: 100 CAPSULE, LIQUID FILLED ORAL at 09:19

## 2020-04-11 NOTE — PDOC DISCHARGE SUMMARY
Impression





- Admit/DC Date/PCP


Admission Date/Primary Care Provider: 


  04/10/20 08:51





  JANELLE SHARMA MD





Discharge Date: 04/11/20





- Discharge Diagnosis


(1) Acute renal failure


Is this a current diagnosis for this admission?: Yes   





(2) Salivary gland adenitis


Is this a current diagnosis for this admission?: Yes   





(3) Diabetes mellitus


Is this a current diagnosis for this admission?: No   





(4) HTN (hypertension)


Is this a current diagnosis for this admission?: No   





(5) Hyperkalemia


Is this a current diagnosis for this admission?: Yes   





(6) Obesity (BMI 30.0-34.9)


Is this a current diagnosis for this admission?: No   





- Assessment


Summary: 


(1) Acute renal failure


Qualifiers: 


   Acute renal failure type: unspecified   Qualified Code(s): N17.9 - Acute 

kidney failure, unspecified   


Is this a current diagnosis for this admission?: Yes   


Plan: 


Likely secondary to Bactrim, Bactrim withheld, IV fluid challenge, follow-up 

chemistry








4/10/2020-patient's baseline creatinine is 1.21 on admission serum creatinine is

2.59 it was sent to 2.8 today.  Remains on hold onto plan to continue IV fluids 

and plan is to recheck the labs tomorrow.  Patient has history of underlying 

CKD.





4/11/2020-latest serum creatinine is 1.5.  Improved from 2.8 at the time of 

admission.  Acute kidney injury is resolved.








(2) Salivary gland adenitis


Is this a current diagnosis for this admission?: Yes   


Plan: 


Versus dental abscess not revealed by CT.  Symptomatic management, trial 

doxycycline





4/10/2020-CT of the facial bones was done no evidence of abscess seen.  Plan is 

to continue p.o. doxycycline at this moment.





4/11/20-prescription for p.o. doxycycline is given for 5 days.








(3) Diabetes mellitus


Qualifiers: 


   Diabetes mellitus type: type 2   Diabetes mellitus long term insulin use: 

without long term use   Diabetes mellitus complication status: without 

complication   Qualified Code(s): E11.9 - Type 2 diabetes mellitus without 

complications   


Is this a current diagnosis for this admission?: No   


Plan: 


Discontinue metformin while in renal failure, Lantus and Humalog sliding scale 

ordered.








4/10/2020-patient has history of type 2 diabetes mellitus on long-term insulin  

Plan to check for hemoglobin A1c and he was started on insulin sliding scale 

before meals and at bedtime.  Diet exercise weight loss lifestyle modifications 

discussed with the patient.





4/11/2020-patient has history of type 2 diabetes mellitus he is on insulin 

sliding scale during the hospital stay and metformin was on hold.  Latest blood 

sugar is 151.  The globin A1c is 8.2.








(4) HTN (hypertension)


Qualifiers: 


   Hypertension type: unspecified   Qualified Code(s): I10 - Essential (primary)

hypertension   


Is this a current diagnosis for this admission?: No   


Plan: 


Outpatient regiment





4/10/2020-patient blood pressure is 116/64 today he is on chlorthalidone and 

furosemide at home plan is to hold medications at this time.





4/11/20-latest blood pressure is 25/62.  Patient is advised to continue 

furosemide but hold lisinopril at this time because of elevated serum potassium 

and MALCOLM at the time of admission and he was advised to follow-up with primary 

care physician for repeat lab work.








(5) Hyperkalemia


Is this a current diagnosis for this admission?: Yes   


Plan: 


Most likely secondary to Bactrim, IV fluid challenge, follow-up chemistry, 

consider Kayexalate or lactulose.





4/10/2020-admitted with a serum potassium of five-point 2 repeat potassium is 

4.9 today.  Patient is on potassium supplementations at home though 

supplementations are on hold.  Hyperkalemia may be secondary to MALCOLM.





4/11/2020-patient came in with serum potassium of 5.2 and he is on potassium 

supplementations at home.  He also came in with MALCOLM.  Serum potassium is 5.0 

today patient was advised about low-salt diet low potassium diet and requested 

to do the lab work within next week at PCP office.








(6) Obesity (BMI 30.0-34.9)


Is this a current diagnosis for this admission?: No   


Plan: 


4/10/2020-patient's BMI is more than 32 diet exercise lifestyle modifications 

discussed with the patient.











- Additional Information


Resuscitation Status: Full Code


Discharge Diet: Diabetic


Discharge Activity: Activity As Tolerated


Referrals: 


JANELLE SHARMA MD [Primary Care Provider] - Follow up as needed


Prescriptions: 


Doxycycline Hyclate [Vibramycin 100 mg Tablet] 100 mg PO Q12 #10 tablet


Home Medications: 








Aspirin [Aspirin EC] 81 mg PO DAILY 10/18/18 


Insulin Glargine,Hum.rec.anlog [Toujeo Solostar] 15 unit SQ DAILY 10/18/18 


Insulin Lispro [Humalog Kwikpen U-100] 15 unit SQ TID 10/18/18 


Metformin HCl [Metformin HCl ER] 1,000 mg PO BID 10/18/18 


Furosemide [Lasix 20 mg Tablet] 20 mg PO QAM #10 tablet 01/03/20 


Atorvastatin Calcium [Lipitor 40 mg Tablet] 40 mg PO DAILY 04/10/20 


Cholecalciferol (Vitamin D3) [Vitamin D3 1000 Unit Tablet] 1,000 unit PO DAILY 

04/10/20 


Isosorbide Mononitrate [Imdur 30 mg Tablet.er] 15 mg PO DAILY 04/10/20 


Metoprolol Succinate [Toprol Xl 25 mg Tab.sr] 25 mg PO DAILY 04/10/20 


Nitroglycerin [Nitrostat 0.4 mg (1/150 Gr) Tabs 25/Bottle] 1 tab SL Q5MP PRN 

04/10/20 


Pantoprazole Sodium [Protonix 40 mg Dr Tablet] 40 mg PO QAM 04/10/20 


Ticagrelor [Brilinta 90 mg Tablet] 1 tab PO BID 04/10/20 


Doxycycline Hyclate [Vibramycin 100 mg Tablet] 100 mg PO Q12 #10 tablet 04/11/20




Insulin Lispro [Humalog Insulin (Lispro) 100 unit/mL] 0 - 12 unit SUBCUT AC  

unit 04/11/20 











History of Present Illiness


History of Present Illness: 


BRANDI ROBLES is a 74 year old male








Physical Exam


Vital Signs: 


                                        











Temp Pulse Resp BP Pulse Ox


 


 97.4 F   90   14   125/62   97 


 


 04/11/20 10:07  04/11/20 10:07  04/11/20 10:07  04/11/20 10:07  04/11/20 10:07








                                 Intake & Output











 04/10/20 04/11/20 04/12/20





 06:59 06:59 06:59


 


Intake Total 1500 1788 200


 


Balance 1500 1788 200


 


Weight 96.2 kg 93.7 kg 











General appearance: PRESENT: no acute distress, well-developed


Head exam: PRESENT: atraumatic


Eye exam: PRESENT: PERRLA


Mouth exam: PRESENT: moist, tongue midline


Teeth exam: PRESENT: poor dentation


Neck exam: ABSENT: carotid bruit, JVD, lymphadenopathy, thyromegaly


Respiratory exam: PRESENT: decreased breath sounds


Cardiovascular exam: PRESENT: RRR.  ABSENT: diastolic murmur, rubs, systolic 

murmur


Pulses: PRESENT: normal dorsalis pedis pul


GI/Abdominal exam: PRESENT: normal bowel sounds, soft.  ABSENT: distended, 

guarding, mass, organolmegaly, rebound, tenderness


Rectal exam: PRESENT: deferred


Extremities exam: PRESENT: full ROM.  ABSENT: calf tenderness, clubbing, pedal 

edema


Neurological exam: PRESENT: alert, awake, oriented to person, oriented to place,

oriented to time, oriented to situation, CN II-XII grossly intact.  ABSENT: 

motor sensory deficit


Psychiatric exam: PRESENT: appropriate affect, normal mood.  ABSENT: homicidal 

ideation, suicidal ideation


Skin exam: PRESENT: dry, intact, warm.  ABSENT: cyanosis, rash





Results


Laboratory Results: 


                                        











WBC  6.3 10^3/uL (4.0-10.5)   04/11/20  05:07    


 


RBC  3.60 10^6/uL (4.35-5.55)  L  04/11/20  05:07    


 


Hgb  9.8 g/dL (13.5-17.0)  L  04/11/20  05:07    


 


Hct  29.5 % (37.9-51.0)  L  04/11/20  05:07    


 


MCV  82 fl (80-97)   04/11/20  05:07    


 


MCH  27.3 pg (27.0-33.4)   04/11/20  05:07    


 


MCHC  33.3 g/dL (32.0-36.0)   04/11/20  05:07    


 


RDW  16.0 % (11.5-14.0)  H  04/11/20  05:07    


 


Plt Count  295 10^3/uL (150-450)   04/11/20  05:07    


 


Lymph % (Auto)  23.1 % (13-45)   04/11/20  05:07    


 


Mono % (Auto)  11.8 % (3-13)   04/11/20  05:07    


 


Eos % (Auto)  1.8 % (0-6)   04/11/20  05:07    


 


Baso % (Auto)  0.3 % (0-2)   04/11/20  05:07    


 


Absolute Neuts (auto)  4.0 10^3/uL (1.7-8.2)   04/11/20  05:07    


 


Absolute Lymphs (auto)  1.5 10^3/uL (0.5-4.7)   04/11/20  05:07    


 


Absolute Monos (auto)  0.7 10^3/uL (0.1-1.4)   04/11/20  05:07    


 


Absolute Eos (auto)  0.1 10^3/uL (0.0-0.6)   04/11/20  05:07    


 


Absolute Basos (auto)  0.0 10^3/uL (0.0-0.2)   04/11/20  05:07    


 


Seg Neutrophils %  63.0 % (42-78)   04/11/20  05:07    


 


ESR  93 mm/hr (0-20)  H  04/10/20  06:43    


 


PT  15.2 SEC (11.4-15.4)   04/09/20  14:07    


 


INR  1.19   04/09/20  14:07    


 


VBG pH  7.37  (7.30-7.42)   04/09/20  17:49    


 


VBG pCO2  41.7 mmHg (35-63)   04/09/20  17:49    


 


VBG HCO3  23.7 mmol/L (20-32)   04/09/20  17:49    


 


VBG Base Excess  -1.4 mmol/L  04/09/20  17:49    


 


Sodium  135.5 mmol/L (137-145)  L  04/11/20  05:07    


 


Potassium  5.0 mmol/L (3.6-5.0)   04/11/20  05:07    


 


Chloride  106 mmol/L ()   04/11/20  05:07    


 


Carbon Dioxide  17 mmol/L (22-30)  L  04/11/20  05:07    


 


Anion Gap  13  (5-19)   04/11/20  05:07    


 


BUN  34 mg/dL (7-20)  H  04/11/20  05:07    


 


Creatinine  1.50 mg/dL (0.52-1.25)  H  04/11/20  05:07    


 


Est GFR ( Amer)  55  (>60)  L  04/11/20  05:07    


 


Est GFR (MDRD) Non-Af  46  (>60)  L  04/11/20  05:07    


 


Glucose  160 mg/dL ()  H  04/11/20  05:07    


 


POC Glucose  151 mg/dL ()  H  04/11/20  06:12    


 


Hemoglobin A1c %  8.2 % (4.7-6.0)  H  04/10/20  06:43    


 


Lactic Acid  1.2 mmol/L (0.7-2.1)   04/10/20  04:18    


 


Calcium  8.4 mg/dL (8.4-10.2)   04/11/20  05:07    


 


Magnesium  1.1 mg/dL (1.6-2.3)  L*  04/11/20  05:07    


 


Total Bilirubin  0.6 mg/dL (0.2-1.3)   04/11/20  05:07    


 


Direct Bilirubin  0.4 mg/dL (0.0-0.4)   04/11/20  05:07    


 


Neonat Total Bilirubin  Not Reportable   04/11/20  05:07    


 


Neonat Direct Bilirubin  Not Reportable   04/11/20  05:07    


 


Neonat Indirect Bili  Not Reportable   04/11/20  05:07    


 


AST  27 U/L (17-59)   04/11/20  05:07    


 


ALT  16 U/L (<50)   04/11/20  05:07    


 


Alkaline Phosphatase  92 U/L ()   04/11/20  05:07    


 


Troponin I  0.022 ng/mL  04/09/20  17:04    


 


NT-Pro-B Natriuret Pep  247 pg/mL (<450)   04/09/20  17:04    


 


Total Protein  7.1 g/dL (6.3-8.2)   04/11/20  05:07    


 


Albumin  3.6 g/dL (3.5-5.0)   04/11/20  05:07    


 


Urine Color  YELLOW   04/09/20  20:50    


 


Urine Appearance  CLOUDY   04/09/20  20:50    


 


Urine pH  5.0  (5.0-9.0)   04/09/20  20:50    


 


Ur Specific Gravity  1.019   04/09/20  20:50    


 


Urine Protein  100 mg/dL (NEGATIVE)  H  04/09/20  20:50    


 


Urine Glucose (UA)  NEGATIVE mg/dL (NEGATIVE)   04/09/20  20:50    


 


Urine Ketones  NEGATIVE mg/dL (NEGATIVE)   04/09/20  20:50    


 


Urine Blood  NEGATIVE  (NEGATIVE)   04/09/20  20:50    


 


Urine Nitrite  NEGATIVE  (NEGATIVE)   04/09/20  20:50    


 


Urine Bilirubin  NEGATIVE  (NEGATIVE)   04/09/20  20:50    


 


Urine Urobilinogen  NEGATIVE mg/dL (<2.0)   04/09/20  20:50    


 


Ur Leukocyte Esterase  NEGATIVE  (NEGATIVE)   04/09/20  20:50    


 


Urine WBC (Auto)  4 /HPF  04/09/20  20:50    


 


Urine RBC (Auto)  9 /HPF  04/09/20  20:50    


 


U Hyaline Cast (Auto)  47 /LPF  04/09/20  20:50    


 


Urine Bacteria (Auto)  3+ /HPF  04/09/20  20:50    


 


Squamous Epi Cells Auto  1 /HPF  04/09/20  20:50    


 


Urine Mucus (Auto)  MANY /LPF  04/09/20  20:50    


 


Urine Osmolality  560 mOsm/kg (300-900)   04/09/20  20:50    


 


Urine Creatinine  194.8 mg/dL ()   04/09/20  20:50    


 


Urine Sodium  88 mmol/L (30-90)   04/09/20  20:50    


 


Urine Potassium  63.8 mmol/L (17-99)   04/09/20  20:50    


 


Urine Ascorbic Acid  20  (NEGATIVE)  H  04/09/20  20:50    


 


Influenza A (Rapid)  NEGATIVE  (NEGATIVE)   04/09/20  20:25    


 


Influenza B (Rapid)  NEGATIVE  (NEGATIVE)   04/09/20  20:25    


 


Group A Strep Rapid  NEGATIVE  (NEGATIVE)   04/09/20  20:55    








                                        











  04/09/20





  17:04


 


Troponin I  0.022


 


NT-Pro-B Natriuret Pep  247











Impressions: 


                                        





Chest X-Ray  04/09/20 19:55


IMPRESSION:


 


Negative low lung volume study.


 


 


copyright 2011 [employer]- All Rights Reserved


 








Facial Bones CT  04/09/20 19:56


IMPRESSION:


No acute bony injury is seen to the face.  Poor dentition.


Significant artifact from dental prostheses and amalgam.


 














Stroke


Is this a Stroke Patient?: No


Stroke Pt being discharged on Anti-thrombolytic therapy?: No


Reason(s) for not prescribing Anti-thrombolytic therapy:: Not indicated





Acute Heart Failure





- **


Is this a Heart Failure Patient?: No

## 2020-04-27 ENCOUNTER — HOSPITAL ENCOUNTER (INPATIENT)
Dept: HOSPITAL 62 - ER | Age: 75
LOS: 2 days | Discharge: HOME | DRG: 641 | End: 2020-04-29
Attending: INTERNAL MEDICINE | Admitting: INTERNAL MEDICINE
Payer: MEDICARE

## 2020-04-27 DIAGNOSIS — I50.9: ICD-10-CM

## 2020-04-27 DIAGNOSIS — E11.9: ICD-10-CM

## 2020-04-27 DIAGNOSIS — E83.42: ICD-10-CM

## 2020-04-27 DIAGNOSIS — Z95.5: ICD-10-CM

## 2020-04-27 DIAGNOSIS — Z86.59: ICD-10-CM

## 2020-04-27 DIAGNOSIS — I11.0: ICD-10-CM

## 2020-04-27 DIAGNOSIS — Z79.4: ICD-10-CM

## 2020-04-27 DIAGNOSIS — I25.10: ICD-10-CM

## 2020-04-27 DIAGNOSIS — E86.0: ICD-10-CM

## 2020-04-27 DIAGNOSIS — E87.5: Primary | ICD-10-CM

## 2020-04-27 DIAGNOSIS — Z82.49: ICD-10-CM

## 2020-04-27 DIAGNOSIS — Z79.899: ICD-10-CM

## 2020-04-27 DIAGNOSIS — R74.8: ICD-10-CM

## 2020-04-27 DIAGNOSIS — M79.671: ICD-10-CM

## 2020-04-27 DIAGNOSIS — N17.9: ICD-10-CM

## 2020-04-27 LAB
ADD MANUAL DIFF: NO
ALBUMIN SERPL-MCNC: 3.5 G/DL (ref 3.5–5)
ALBUMIN SERPL-MCNC: 3.6 G/DL (ref 3.5–5)
ALBUMIN SERPL-MCNC: 4.4 G/DL (ref 3.5–5)
ALP SERPL-CCNC: 66 U/L (ref 38–126)
ALP SERPL-CCNC: 75 U/L (ref 38–126)
ALP SERPL-CCNC: 78 U/L (ref 38–126)
ANION GAP SERPL CALC-SCNC: 10 MMOL/L (ref 5–19)
ANION GAP SERPL CALC-SCNC: 7 MMOL/L (ref 5–19)
ANION GAP SERPL CALC-SCNC: 9 MMOL/L (ref 5–19)
APPEARANCE UR: CLEAR
APTT PPP: (no result) S
AST SERPL-CCNC: 17 U/L (ref 17–59)
AST SERPL-CCNC: 18 U/L (ref 17–59)
AST SERPL-CCNC: 24 U/L (ref 17–59)
BASOPHILS # BLD AUTO: 0 10^3/UL (ref 0–0.2)
BASOPHILS NFR BLD AUTO: 0.1 % (ref 0–2)
BILIRUB DIRECT SERPL-MCNC: 0 MG/DL (ref 0–0.4)
BILIRUB SERPL-MCNC: 0.4 MG/DL (ref 0.2–1.3)
BILIRUB SERPL-MCNC: 0.5 MG/DL (ref 0.2–1.3)
BILIRUB SERPL-MCNC: 0.7 MG/DL (ref 0.2–1.3)
BILIRUB UR QL STRIP: NEGATIVE
BUN SERPL-MCNC: 33 MG/DL (ref 7–20)
BUN SERPL-MCNC: 36 MG/DL (ref 7–20)
BUN SERPL-MCNC: 39 MG/DL (ref 7–20)
CALCIUM: 8.7 MG/DL (ref 8.4–10.2)
CALCIUM: 8.9 MG/DL (ref 8.4–10.2)
CALCIUM: 9.9 MG/DL (ref 8.4–10.2)
CHLORIDE SERPL-SCNC: 108 MMOL/L (ref 98–107)
CHLORIDE SERPL-SCNC: 110 MMOL/L (ref 98–107)
CHLORIDE SERPL-SCNC: 110 MMOL/L (ref 98–107)
CO2 SERPL-SCNC: 19 MMOL/L (ref 22–30)
CO2 SERPL-SCNC: 19 MMOL/L (ref 22–30)
CO2 SERPL-SCNC: 22 MMOL/L (ref 22–30)
EOSINOPHIL # BLD AUTO: 0.1 10^3/UL (ref 0–0.6)
EOSINOPHIL NFR BLD AUTO: 1.6 % (ref 0–6)
ERYTHROCYTE [DISTWIDTH] IN BLOOD BY AUTOMATED COUNT: 17.9 % (ref 11.5–14)
GLUCOSE SERPL-MCNC: 117 MG/DL (ref 75–110)
GLUCOSE SERPL-MCNC: 154 MG/DL (ref 75–110)
GLUCOSE SERPL-MCNC: 218 MG/DL (ref 75–110)
GLUCOSE UR STRIP-MCNC: 50 MG/DL
HCT VFR BLD CALC: 34.6 % (ref 37.9–51)
HGB BLD-MCNC: 11.4 G/DL (ref 13.5–17)
KETONES UR STRIP-MCNC: NEGATIVE MG/DL
LYMPHOCYTES # BLD AUTO: 1.9 10^3/UL (ref 0.5–4.7)
LYMPHOCYTES NFR BLD AUTO: 23.8 % (ref 13–45)
MCH RBC QN AUTO: 27.4 PG (ref 27–33.4)
MCHC RBC AUTO-ENTMCNC: 32.9 G/DL (ref 32–36)
MCV RBC AUTO: 83 FL (ref 80–97)
MONOCYTES # BLD AUTO: 0.8 10^3/UL (ref 0.1–1.4)
MONOCYTES NFR BLD AUTO: 10.2 % (ref 3–13)
NEUTROPHILS # BLD AUTO: 5.1 10^3/UL (ref 1.7–8.2)
NEUTS SEG NFR BLD AUTO: 64.3 % (ref 42–78)
NITRITE UR QL STRIP: NEGATIVE
PH UR STRIP: 5 [PH] (ref 5–9)
PLATELET # BLD: 207 10^3/UL (ref 150–450)
POTASSIUM SERPL-SCNC: 5.7 MMOL/L (ref 3.6–5)
POTASSIUM SERPL-SCNC: 5.8 MMOL/L (ref 3.6–5)
POTASSIUM SERPL-SCNC: 7.6 MMOL/L (ref 3.6–5)
PROT SERPL-MCNC: 6.6 G/DL (ref 6.3–8.2)
PROT SERPL-MCNC: 6.8 G/DL (ref 6.3–8.2)
PROT SERPL-MCNC: 7.9 G/DL (ref 6.3–8.2)
PROT UR STRIP-MCNC: NEGATIVE MG/DL
RBC # BLD AUTO: 4.15 10^6/UL (ref 4.35–5.55)
SP GR UR STRIP: 1.01
TOTAL CELLS COUNTED % (AUTO): 100 %
UROBILINOGEN UR-MCNC: NEGATIVE MG/DL (ref ?–2)
WBC # BLD AUTO: 7.9 10^3/UL (ref 4–10.5)

## 2020-04-27 PROCEDURE — 99284 EMERGENCY DEPT VISIT MOD MDM: CPT

## 2020-04-27 PROCEDURE — 83690 ASSAY OF LIPASE: CPT

## 2020-04-27 PROCEDURE — 83735 ASSAY OF MAGNESIUM: CPT

## 2020-04-27 PROCEDURE — 96375 TX/PRO/DX INJ NEW DRUG ADDON: CPT

## 2020-04-27 PROCEDURE — 85025 COMPLETE CBC W/AUTO DIFF WBC: CPT

## 2020-04-27 PROCEDURE — 80053 COMPREHEN METABOLIC PANEL: CPT

## 2020-04-27 PROCEDURE — G0378 HOSPITAL OBSERVATION PER HR: HCPCS

## 2020-04-27 PROCEDURE — 81001 URINALYSIS AUTO W/SCOPE: CPT

## 2020-04-27 PROCEDURE — 82962 GLUCOSE BLOOD TEST: CPT

## 2020-04-27 PROCEDURE — 84550 ASSAY OF BLOOD/URIC ACID: CPT

## 2020-04-27 PROCEDURE — 96361 HYDRATE IV INFUSION ADD-ON: CPT

## 2020-04-27 PROCEDURE — 36415 COLL VENOUS BLD VENIPUNCTURE: CPT

## 2020-04-27 PROCEDURE — 80048 BASIC METABOLIC PNL TOTAL CA: CPT

## 2020-04-27 PROCEDURE — 96365 THER/PROPH/DIAG IV INF INIT: CPT

## 2020-04-27 PROCEDURE — 93010 ELECTROCARDIOGRAM REPORT: CPT

## 2020-04-27 PROCEDURE — 87070 CULTURE OTHR SPECIMN AEROBIC: CPT

## 2020-04-27 PROCEDURE — 93005 ELECTROCARDIOGRAM TRACING: CPT

## 2020-04-27 RX ADMIN — SODIUM CHLORIDE PRN MLS/HR: 9 INJECTION, SOLUTION INTRAVENOUS at 17:58

## 2020-04-27 RX ADMIN — SODIUM CHLORIDE PRN MLS/HR: 9 INJECTION, SOLUTION INTRAVENOUS at 19:09

## 2020-04-27 NOTE — EKG REPORT
SEVERITY:- ABNORMAL ECG -

SINUS RHYTHM

LEFT AXIS DEVIATION

BORDERLINE R WAVE PROGRESSION, ANTERIOR LEADS

NONSPECIFIC T ABNORMALITIES, LATERAL LEADS

:

Confirmed by: Sondra Soares MD 27-Apr-2020 21:40:16

## 2020-04-27 NOTE — ER DOCUMENT REPORT
ED General





<SANJU CAGE - Last Filed: 04/27/20 23:15>





- General


Mode of Arrival: Ambulatory


Information source: Patient


TRAVEL OUTSIDE OF THE [place] IN LAST 30 DAYS: No





- HPI


Onset: Other - 3 days ago labs were drawn with potassium of 5.9


Onset/Duration: Persistent


Quality of pain: No pain


Severity: None


Pain Level: Denies


Associated symptoms: None


Exacerbated by: Denies


Relieved by: Denies


Similar symptoms previously: Yes


Recently seen / treated by doctor: Yes





<CHASTITY FISH - Last Filed: 04/28/20 22:37>





- General


Chief Complaint: Abnormal Lab Results


Stated Complaint: ABNORMAL LABS


Time Seen by Provider: 04/27/20 15:29


Notes: 





74-year-old male presented to ED for complaint of abnormal labs.  Patient states

that he had labs drawn 3 days ago at his primary doctor's office and they called

him today to tell him his potassium was 3.9.  He states he does take daily 

potassium replacement he also eats multiple bananas and orange juice.  He states

he drinks orange juice most of the day.  He is a diabetic I have informed him 

that when his potassium is high he cannot be eating bananas or drinking as much 

orange juice as he does.  He is also was informed that orange juice is not good 

for his diabetes.  He states he has not had any symptoms he does not feel ill at

all except for his intermittent diarrhea that he has all the time.  He was alert

oriented respirations regular nonlabored speaking in full sentences.  When I 

rechecked his potassium it was 7.6 with a magnesium of 1.2 sodium of 136.3 and 

chloride of 108.  I did speak with Dr Jewell who recommended Kaopectate 15 g 

by mouth and 25 g of D50 with insulin 10 units IV.  I did contact the 

hospitalist for admission and they stated that he had to have all of his 

medications and fluids and his potassium had to be much lower before they could 

admit him.  He was also given magnesium 1 g IV.  After his treatment above at 7 

PM his chemistry was repeated his potassium was now 5.8.  I contacted Dr. Whiting who is the hospitalist and he states the patient no longer needs to be 

admitted he can go home.  I informed Dr. Whiting that I did not feel comfortable

discharging someone him with a potassium of 5.9.  He requested that I order the 

patient Lasix give him another liter of fluids and order a chemistry for 10 PM. 

He states he will discharge the patient when the chemistry results. 

(CHASTITY FISH)





- Related Data


Allergies/Adverse Reactions: 


                                        





No Known Allergies Allergy (Verified 01/03/20 11:30)


   











Past Medical History





- General


Information source: Patient





- Social History


Smoking Status: Never Smoker


Frequency of alcohol use: Occasional


Drug Abuse: None


Lives with: Family


Family History: Hypertension


Patient has suicidal ideation: No


Patient has homicidal ideation: No





- Past Medical History


Cardiac Medical History: Reports: Hx Congestive Heart Failure, Hx Coronary 

Artery Disease, Hx Hypercholesterolemia, Hx Hypertension


Pulmonary Medical History: Reports: Hx Bronchitis


EENT Medical History: Reports: None


Neurological Medical History: Reports: None


Endocrine Medical History: Reports: Hx Diabetes Mellitus Type 2


Renal/ Medical History: Reports: None


Malignancy Medical History: Reports None


GI Medical History: Reports: None


Musculoskeletal Medical History: Reports None


Skin Medical History: Reports None


Psychiatric Medical History: Reports: Hx Post Traumatic Stress Disorder


Traumatic Medical History: Reports: None


Infectious Medical History: Reports: None


Past Surgical History: Reports: Hx Orthopedic Surgery - Left hand surgery





<CHASTITY FISH - Last Filed: 04/28/20 22:37>





Review of Systems





- Review of Systems


Constitutional: No symptoms reported


EENT: No symptoms reported


Cardiovascular: No symptoms reported


Respiratory: No symptoms reported


Gastrointestinal: No symptoms reported


Genitourinary: No symptoms reported


Male Genitourinary: No symptoms reported


Musculoskeletal: No symptoms reported


Skin: No symptoms reported


Hematologic/Lymphatic: No symptoms reported


Neurological/Psychological: No symptoms reported


-: Yes All other systems reviewed and negative





<CHASTITY FISH - Last Filed: 04/28/20 22:37>





Physical Exam





- Vital signs


Interpretation: Normal





- General


General appearance: Appears well, Alert





- HEENT


Head: Normocephalic, Atraumatic


Eyes: Normal


Pupils: PERRL





- Respiratory


Respiratory status: No respiratory distress


Chest status: Nontender


Breath sounds: Normal


Chest palpation: Normal





- Cardiovascular


Rhythm: Regular


Heart sounds: Normal auscultation


Murmur: No





- Abdominal


Inspection: Normal


Distension: No distension


Bowel sounds: Normal


Tenderness: Nontender


Organomegaly: No organomegaly





- Back


Back: Normal, Nontender





- Extremities


General upper extremity: Normal inspection, Nontender, Normal color, Normal ROM,

Normal temperature


General lower extremity: Normal inspection, Nontender, Normal color, Normal ROM,

Normal temperature, Normal weight bearing.  No: Liss's sign





- Neurological


Neuro grossly intact: Yes


Cognition: Normal


Orientation: AAOx4


[place] Coma Scale Eye Opening: Spontaneous


[place] Coma Scale Verbal: Oriented


[place] Coma Scale Motor: Obeys Commands


[place] Coma Scale Total: 15


Speech: Normal


Motor strength normal: LUE, RUE, LLE, RLE


Sensory: Normal





- Psychological


Associated symptoms: Normal affect, Normal mood





- Skin


Skin Temperature: Warm


Skin Moisture: Dry


Skin Color: Normal





<CHASTITY FISH - Last Filed: 04/28/20 22:37>





- Vital signs


Vitals: 


                                        











Temp Pulse Resp BP Pulse Ox


 


 97.8 F   70   18   136/59 H  96 


 


 04/27/20 15:25  04/27/20 15:25  04/27/20 15:25  04/27/20 15:25  04/27/20 15:25














Course





- Laboratory


Result Diagrams: 


                                 04/27/20 17:31





                                 04/27/20 22:09





<SANJU CAGE - Last Filed: 04/27/20 23:15>





- Laboratory


Result Diagrams: 


                                 04/27/20 17:31





                                 04/28/20 05:02





<CHASTITY FISH - Last Filed: 04/28/20 22:37>





- Re-evaluation


Re-evalutation: 





04/27/20 23:14


I assumed care of this patient at the end of the nurse practitioner shift.  In 

short he came in with abnormal labs, known hyperkalemia.  He had insulin, 

dextrose, Lasix, fluids, Kayexalate.  His potassium remained high.  Medicine was

consulted regarding admission of this patient, asked for 1 more recheck, and had

tentative plans for possible discharge.  Upon seeing the patient's continued 

elevated potassium, Dr. Whiting did in fact agree to admit the patient. 

(SANJU CAGE)





- Vital Signs


Vital signs: 


                                        











Temp Pulse Resp BP Pulse Ox


 


 97.5 F   79   12   150/72 H  97 


 


 04/28/20 15:00  04/28/20 19:54  04/28/20 19:54  04/28/20 19:54  04/28/20 19:54














- Laboratory


Laboratory results interpreted by me: 


                                        











  04/27/20 04/27/20 04/27/20





  15:55 17:31 19:08


 


RBC   4.15 L 


 


Hgb   11.4 L 


 


Hct   34.6 L 


 


RDW   17.9 H 


 


Sodium  136.3 L  


 


Potassium  7.6 H*   5.8 H D


 


Chloride  108 H   110 H


 


Carbon Dioxide  19 L  


 


BUN  39 H   36 H


 


Creatinine  1.34 H   1.27 H


 


Est GFR (MDRD) Non-Af  52 L   55 L


 


Glucose  117 H   154 H


 


Magnesium  1.2 L*  


 


Lipase  1162.7 H  


 


Urine Glucose (UA)   














  04/27/20 04/27/20





  19:16 22:09


 


RBC  


 


Hgb  


 


Hct  


 


RDW  


 


Sodium  


 


Potassium   5.7 H


 


Chloride   110 H


 


Carbon Dioxide   19 L


 


BUN   33 H


 


Creatinine  


 


Est GFR (MDRD) Non-Af   57 L


 


Glucose   218 H


 


Magnesium  


 


Lipase  


 


Urine Glucose (UA)  50 H 














Discharge





- Discharge


Admitting Provider: Henok (Hospitalist)





<SANJU CAGE - Last Filed: 04/27/20 23:15>





<CHASTITY FISH - Last Filed: 04/28/20 22:37>





- Discharge


Clinical Impression: 


 Hyperkalemia, Hypomagnesemia





Condition: Stable


Disposition: ADMITTED AS INPATIENT

## 2020-04-27 NOTE — EKG REPORT
SEVERITY:- OTHERWISE NORMAL ECG -

SINUS RHYTHM

LEFT AXIS DEVIATION

:

Confirmed by: Sondra Soares MD 27-Apr-2020 21:40:06

## 2020-04-27 NOTE — EKG REPORT
SEVERITY:- ABNORMAL ECG -

SINUS RHYTHM

LEFT AXIS DEVIATION

CONSIDER ANTERIOR INFARCT

NONSPECIFIC T ABNORMALITIES, LATERAL LEADS

:

Confirmed by: Sondra Soares MD 27-Apr-2020 21:40:29

## 2020-04-27 NOTE — ER DOCUMENT REPORT
ED Medical Screen (RME)





- General


Chief Complaint: Abnormal Lab Results


Stated Complaint: ABNORMAL LABS


Time Seen by Provider: 04/27/20 15:29


Primary Care Provider: 


JANELLE SHARMA MD [Primary Care Provider] - Follow up as needed


Mode of Arrival: Ambulatory


Information source: Patient


Notes: 





Patient is a 74-year-old male presenting to the emergency department chief 

complaint of abnormal labs.  Patient reports he had labs drawn 3 days ago at his

primary care provider's office.  They called him today and told him his 

potassium was 6.9.  Patient does report taking a daily potassium replacement.  

He denies any symptoms today states he is feeling quite well.  He does report 

that over the last couple of days he has had intermittent diarrhea.





Patient is alert, oriented, nontoxic in appearance.





I have greeted and performed a rapid initial assessment of this patient.  A 

comprehensive ED assessment and evaluation of the patient, analysis of test 

results and completion of the medical decision making process will be conducted 

by additional ED providers.  I have specifically instructed the patient or 

family members with the patient to immediately return to any nursing staff 

should anything change in the patient's condition or with their chief complaint.





TRAVEL OUTSIDE OF THE [place] IN LAST 30 DAYS: No





- Related Data


Allergies/Adverse Reactions: 


                                        





No Known Allergies Allergy (Verified 01/03/20 11:30)


   











Past Medical History





- Past Medical History


Cardiac Medical History: Reports: Hx Congestive Heart Failure, Hx Coronary 

Artery Disease, Hx Hypercholesterolemia, Hx Hypertension


Pulmonary Medical History: Reports: Hx Bronchitis


Endocrine Medical History: Reports: Hx Diabetes Mellitus Type 2


Renal/ Medical History: Denies: Hx Peritoneal Dialysis


Psychiatric Medical History: Reports: Hx Post Traumatic Stress Disorder


Past Surgical History: Reports: Hx Orthopedic Surgery - Left hand surgery





Physical Exam





- Vital signs


Vitals: 





                                        











Temp Pulse Resp BP Pulse Ox


 


 97.8 F   70   18   136/59 H  96 


 


 04/27/20 15:25  04/27/20 15:25  04/27/20 15:25  04/27/20 15:25  04/27/20 15:25














Course





- Vital Signs


Vital signs: 





                                        











Temp Pulse Resp BP Pulse Ox


 


 97.8 F   70   18   136/59 H  96 


 


 04/27/20 15:25  04/27/20 15:25  04/27/20 15:25  04/27/20 15:25  04/27/20 15:25














Doctor's Discharge





- Discharge


Referrals: 


JANELLE SHARMA MD [Primary Care Provider] - Follow up as needed

## 2020-04-28 LAB
ANION GAP SERPL CALC-SCNC: 8 MMOL/L (ref 5–19)
BUN SERPL-MCNC: 30 MG/DL (ref 7–20)
CALCIUM: 9.2 MG/DL (ref 8.4–10.2)
CHLORIDE SERPL-SCNC: 108 MMOL/L (ref 98–107)
CO2 SERPL-SCNC: 21 MMOL/L (ref 22–30)
GLUCOSE SERPL-MCNC: 175 MG/DL (ref 75–110)
POTASSIUM SERPL-SCNC: 5.5 MMOL/L (ref 3.6–5)

## 2020-04-28 RX ADMIN — ACETAMINOPHEN PRN MG: 325 TABLET ORAL at 10:02

## 2020-04-28 RX ADMIN — SODIUM POLYSTYRENE SULFONATE SCH GM: 15 SUSPENSION ORAL; RECTAL at 21:07

## 2020-04-28 RX ADMIN — TICAGRELOR SCH MG: 90 TABLET ORAL at 21:07

## 2020-04-28 RX ADMIN — INSULIN LISPRO SCH UNIT: 100 INJECTION, SOLUTION INTRAVENOUS; SUBCUTANEOUS at 17:34

## 2020-04-28 RX ADMIN — INSULIN LISPRO SCH UNIT: 100 INJECTION, SOLUTION INTRAVENOUS; SUBCUTANEOUS at 21:07

## 2020-04-28 RX ADMIN — HEPARIN SODIUM SCH UNIT: 5000 INJECTION, SOLUTION INTRAVENOUS; SUBCUTANEOUS at 21:06

## 2020-04-28 RX ADMIN — TICAGRELOR SCH MG: 90 TABLET ORAL at 10:02

## 2020-04-28 RX ADMIN — ASPIRIN SCH MG: 81 TABLET, COATED ORAL at 12:49

## 2020-04-28 RX ADMIN — HEPARIN SODIUM SCH UNIT: 5000 INJECTION, SOLUTION INTRAVENOUS; SUBCUTANEOUS at 05:03

## 2020-04-28 RX ADMIN — ACETAMINOPHEN PRN MG: 325 TABLET ORAL at 14:45

## 2020-04-28 RX ADMIN — ACETAMINOPHEN PRN MG: 325 TABLET ORAL at 01:48

## 2020-04-28 RX ADMIN — INSULIN LISPRO SCH: 100 INJECTION, SOLUTION INTRAVENOUS; SUBCUTANEOUS at 17:28

## 2020-04-28 RX ADMIN — INSULIN LISPRO SCH UNIT: 100 INJECTION, SOLUTION INTRAVENOUS; SUBCUTANEOUS at 11:58

## 2020-04-28 RX ADMIN — INSULIN LISPRO SCH: 100 INJECTION, SOLUTION INTRAVENOUS; SUBCUTANEOUS at 13:53

## 2020-04-28 RX ADMIN — HEPARIN SODIUM SCH UNIT: 5000 INJECTION, SOLUTION INTRAVENOUS; SUBCUTANEOUS at 13:47

## 2020-04-28 RX ADMIN — ACETAMINOPHEN PRN MG: 325 TABLET ORAL at 05:50

## 2020-04-28 NOTE — PDOC H&P
History of Present Illness


Admission Date/PCP: 


  04/27/20 23:22





  JANELLE SHARMA MD





Patient complains of: Abnormal labs


History of Present Illness: 


BRANDI ROBLES is a 74 year old male with a past medical history of diabetes, 

hypertension, coronary artery disease with stent on Brilinta, congestive heart 

failure with unknown ejection fraction on Lasix and supplemental potassium.  He 

presents referred from primary care for hyperkalemia.  His labs are repeated in 

the emergency department finding with abnormal labs including a prerenal 

azotemia with a BUN of 39 and a creatinine 1.3, potassium of 7.6 and magnesium 

of 1.2.  He receives an IV saline challenge, dextrose, insulin, Kayexalate 

resulting in reduction of potassium to 5.8.  He has no peak T waves he is 

referred to the hospitalist for admission.


Patient admits to potassium supplementation in addition to an effort to consume 

orange juice and bananas daily.  He receives education.  He otherwise denies 

palpitations nausea vomiting or chest pain.  He does admit to constipation.





Past Medical History


Cardiac Medical History: Reports: Congestive Heart Failure, Coronary Artery 

Disease, Hyperlipidema, Hypertension


Pulmonary Medical History: Reports: Bronchitis


EENT Medical History: Reports: None


Neurological Medical History: Reports: None


Endocrine Medical History: Reports: Diabetes Mellitus Type 2


Renal/ Medical History: Reports: None


Malignancy Medical History: Reports: None


GI Medical History: Reports: None


Musculoskeltal Medical History: Reports: None


Skin Medical History: Reports: None


Psychiatric Medical History: Reports: Post Traumatic Stress Disorder


Traumatic Medical History: Reports: None


Infectious Medical History: Reports: None





Past Surgical History


Past Surgical History: Reports: Orthopedic Surgery - Left hand surgery





Social History


Information Source: Patient


Lives with: Family


Smoking Status: Never Smoker


Frequency of Alcohol Use: Occasional


Hx Recreational Drug Use: No


Drugs: None


Hx Prescription Drug Abuse: No





- Advance Directive


Resuscitation Status: Full Code





Family History


Family History: Hypertension


Parental Family History Reviewed: Yes


Children Family History Reviewed: Yes


Sibling(s) Family History Reviewed.: Yes





Medication/Allergy


Home Medications: 








Aspirin [Aspirin EC] 81 mg PO DAILY 10/18/18 


Insulin Glargine,Hum.rec.anlog [Toujeo Solostar] 15 unit SQ DAILY 10/18/18 


Insulin Lispro [Humalog Kwikpen U-100] 15 unit SQ TID 10/18/18 


Metformin HCl [Metformin HCl ER] 1,000 mg PO BID 10/18/18 


Furosemide [Lasix 20 mg Tablet] 20 mg PO QAM #10 tablet 01/03/20 


Atorvastatin Calcium [Lipitor 40 mg Tablet] 40 mg PO DAILY 04/10/20 


Cholecalciferol (Vitamin D3) [Vitamin D3 1000 Unit Tablet] 1,000 unit PO DAILY 

04/10/20 


Isosorbide Mononitrate [Imdur 30 mg Tablet.er] 15 mg PO DAILY 04/10/20 


Metoprolol Succinate [Toprol Xl 25 mg Tab.sr] 25 mg PO DAILY 04/10/20 


Nitroglycerin [Nitrostat 0.4 mg (1/150 Gr) Tabs 25/Bottle] 1 tab SL Q5MP PRN 

04/10/20 


Pantoprazole Sodium [Protonix 40 mg Dr Tablet] 40 mg PO QAM 04/10/20 


Ticagrelor [Brilinta 90 mg Tablet] 1 tab PO BID 04/10/20 


Doxycycline Hyclate [Vibramycin 100 mg Tablet] 100 mg PO Q12 #10 tablet 04/11/20




Insulin Lispro [Humalog Insulin (Lispro) 100 unit/mL] 0 - 12 unit SUBCUT AC  

unit 04/11/20 








Allergies/Adverse Reactions: 


                                        





No Known Allergies Allergy (Verified 01/03/20 11:30)


   











Review of Systems


Constitutional: ABSENT: chills, fever(s), headache(s), weight gain, weight loss


Eyes: ABSENT: visual disturbances


Ears: ABSENT: hearing changes


Cardiovascular: ABSENT: chest pain, dyspnea on exertion, edema, orthropnea, 

palpitations


Respiratory: ABSENT: cough, hemoptysis


Gastrointestinal: ABSENT: abdominal pain, constipation, diarrhea, hematemesis, 

hematochezia, nausea, vomiting


Genitourinary: ABSENT: dysuria, hematuria


Musculoskeletal: ABSENT: joint swelling


Integumentary: ABSENT: rash, wounds


Neurological: ABSENT: abnormal gait, abnormal speech, confusion, dizziness, 

focal weakness, syncope


Psychiatric: ABSENT: anxiety, depression, homidical ideation, suicidal ideation


Endocrine: ABSENT: cold intolerance, heat intolerance, polydipsia, polyuria


Hematologic/Lymphatic: ABSENT: easy bleeding, easy bruising





Physical Exam


Vital Signs: 


                                        











Temp Pulse Resp BP Pulse Ox


 


 97.7 F   74   16   134/87 H  100 


 


 04/28/20 00:14  04/28/20 02:00  04/28/20 00:14  04/28/20 00:14  04/28/20 00:14








                                 Intake & Output











 04/26/20 04/27/20 04/28/20





 11:59 11:59 11:59


 


Intake Total   3100


 


Balance   3100


 


Weight   94.8 kg











General appearance: PRESENT: no acute distress, well-developed, well-nourished


Head exam: PRESENT: atraumatic, normocephalic


Eye exam: PRESENT: conjunctiva pink, EOMI, PERRLA.  ABSENT: scleral icterus


Ear exam: PRESENT: normal external ear exam


Mouth exam: PRESENT: moist, tongue midline


Neck exam: ABSENT: carotid bruit, JVD, lymphadenopathy, thyromegaly


Respiratory exam: PRESENT: clear to auscultation karen.  ABSENT: rales, rhonchi, 

wheezes


Cardiovascular exam: PRESENT: RRR.  ABSENT: diastolic murmur, rubs, systolic 

murmur


Pulses: PRESENT: normal dorsalis pedis pul


Vascular exam: PRESENT: normal capillary refill


GI/Abdominal exam: PRESENT: normal bowel sounds, soft.  ABSENT: distended, gu

arding, mass, organolmegaly, rebound, tenderness


Rectal exam: PRESENT: deferred


Extremities exam: PRESENT: full ROM.  ABSENT: calf tenderness, clubbing, pedal 

edema


Neurological exam: PRESENT: alert, awake, oriented to person, oriented to place,

oriented to time, oriented to situation, CN II-XII grossly intact.  ABSENT: mot

or sensory deficit


Psychiatric exam: PRESENT: appropriate affect, normal mood.  ABSENT: homicidal 

ideation, suicidal ideation


Skin exam: PRESENT: dry, intact, warm.  ABSENT: cyanosis, rash





Results


Laboratory Results: 


                                        





                                 04/27/20 17:31 





                                 04/27/20 22:09 





                                        











  04/27/20 04/27/20 04/27/20





  15:55 17:31 19:08


 


WBC   7.9 


 


RBC   4.15 L 


 


Hgb   11.4 L 


 


Hct   34.6 L 


 


MCV   83 


 


MCH   27.4 


 


MCHC   32.9 


 


RDW   17.9 H 


 


Plt Count   207 


 


Seg Neutrophils %   64.3 


 


Sodium  136.3 L   138.9


 


Potassium  7.6 H*   5.8 H D


 


Chloride  108 H   110 H


 


Carbon Dioxide  19 L   22


 


Anion Gap  9   7


 


BUN  39 H   36 H


 


Creatinine  1.34 H   1.27 H


 


Est GFR ( Amer)  > 60   > 60


 


Glucose  117 H   154 H


 


Calcium  9.9   8.9


 


Magnesium  1.2 L*  


 


Total Bilirubin  0.7   0.5


 


AST  24   17


 


Alkaline Phosphatase  75   66


 


Total Protein  7.9   6.6


 


Albumin  4.4   3.5


 


Lipase  1162.7 H  


 


Urine Color   


 


Urine Appearance   


 


Urine pH   


 


Ur Specific Gravity   


 


Urine Protein   


 


Urine Glucose (UA)   


 


Urine Ketones   


 


Urine Blood   


 


Urine Nitrite   


 


Ur Leukocyte Esterase   


 


Urine WBC (Auto)   














  04/27/20 04/27/20





  19:16 22:09


 


WBC  


 


RBC  


 


Hgb  


 


Hct  


 


MCV  


 


MCH  


 


MCHC  


 


RDW  


 


Plt Count  


 


Seg Neutrophils %  


 


Sodium   138.5


 


Potassium   5.7 H


 


Chloride   110 H


 


Carbon Dioxide   19 L


 


Anion Gap   10


 


BUN   33 H


 


Creatinine   1.23


 


Est GFR (African Amer)   > 60


 


Glucose   218 H


 


Calcium   8.7


 


Magnesium  


 


Total Bilirubin   0.4


 


AST   18


 


Alkaline Phosphatase   78


 


Total Protein   6.8


 


Albumin   3.6


 


Lipase  


 


Urine Color  STRAW 


 


Urine Appearance  CLEAR 


 


Urine pH  5.0 


 


Ur Specific Gravity  1.012 


 


Urine Protein  NEGATIVE 


 


Urine Glucose (UA)  50 H 


 


Urine Ketones  NEGATIVE 


 


Urine Blood  NEGATIVE 


 


Urine Nitrite  NEGATIVE 


 


Ur Leukocyte Esterase  NEGATIVE 


 


Urine WBC (Auto)  0 














Assessment and Plan





- Diagnosis


(1) Hyperkalemia


Is this a current diagnosis for this admission?: Yes   


Plan: 


Secondary to potassium supplementation, complicated by diet follow-up dietitian 

consult for patient education, continue Kayexalate, IV fluids, follow-up 

chemistry.








(2) Hypomagnesemia


Is this a current diagnosis for this admission?: Yes   


Plan: 


IV repletion,








(3) Acute renal failure


Qualifiers: 


   Acute renal failure type: unspecified   Qualified Code(s): N17.9 - Acute 

kidney failure, unspecified   


Is this a current diagnosis for this admission?: Yes   


Plan: 


Prerenal state, excessive Lasix suggest half dose and follow-up with primary 

care in 1 to 2 weeks








(4) Dehydration


Is this a current diagnosis for this admission?: Yes   


Plan: 


Reduce Lasix dose.








(5) Diabetes mellitus


Qualifiers: 


   Diabetes mellitus type: type 2   Diabetes mellitus long term insulin use: 

without long term use   Diabetes mellitus complication status: without 

complication   Qualified Code(s): E11.9 - Type 2 diabetes mellitus without 

complications   


Is this a current diagnosis for this admission?: Yes   


Plan: 


Outpatient regiment metformin held, Humalog sliding scale ordered








- Time


Time Spent with patient: 25-34 minutes

## 2020-04-28 NOTE — RADIOLOGY REPORT (SQ)
EXAM DESCRIPTION:  FOOT RIGHT 2 VIEWS



IMAGES COMPLETED DATE/TIME:  4/28/2020 12:43 pm



REASON FOR STUDY:  Pain and swelling



COMPARISON:  None.



NUMBER OF VIEWS:  Two views.



TECHNIQUE:  AP and lateral radiographic images acquired of the right foot.



LIMITATIONS:  None.



FINDINGS:  MINERALIZATION: Normal.

BONES: No acute fracture or dislocation.  No worrisome bone lesions.

JOINTS: Degenerative changes involving the 1st metatarsal phalangeal joint.

SOFT TISSUES: Soft tissue swelling at the 1st metatarsal phalangeal joint an along the dorsal aspect 
of the foot.

OTHER: No other significant finding.



IMPRESSION:  Probable gout involving the 1st metatarsal phalangeal joint.  No acute fracture or dislo
cation.



TECHNICAL DOCUMENTATION:  JOB ID:  [id number]

 2011 [employer]- All Rights Reserved



Reading location - IP/workstation name: [email or web address]

## 2020-04-28 NOTE — PDOC PROGRESS REPORT
Subjective


Progress Note for:: 04/28/20


Subjective:: 





Patient states he feels better.  Medications reviewed with him.  He appears he 

had been on Bactrim without was a couple of weeks ago.  He has lately been on 

doxycycline.


Reason For Visit: 


HYPERKALEMIA








Physical Exam


Vital Signs: 


                                        











Temp Pulse Resp BP Pulse Ox


 


 98.0 F   74   14   146/79 H  98 


 


 04/28/20 07:00  04/28/20 07:00  04/28/20 07:00  04/28/20 07:00  04/28/20 07:00








                                 Intake & Output











 04/27/20 04/28/20 04/29/20





 06:59 06:59 06:59


 


Intake Total  3700 240


 


Balance  3700 240


 


Weight  94.8 kg 











General appearance: PRESENT: no acute distress, well-developed, well-nourished


Head exam: PRESENT: atraumatic, normocephalic


Eye exam: PRESENT: conjunctiva pink, EOMI, PERRLA.  ABSENT: scleral icterus


Ear exam: PRESENT: normal external ear exam


Mouth exam: PRESENT: moist, tongue midline


Neck exam: ABSENT: carotid bruit, JVD, lymphadenopathy, thyromegaly


Respiratory exam: PRESENT: clear to auscultation karen.  ABSENT: rales, rhonchi, 

wheezes


Cardiovascular exam: PRESENT: RRR, +S1, +S2.  ABSENT: diastolic murmur, rubs, 

systolic murmur


Pulses: PRESENT: normal dorsalis pedis pul


Vascular exam: PRESENT: normal capillary refill


GI/Abdominal exam: PRESENT: normal bowel sounds, soft.  ABSENT: distended, 

guarding, mass, organolmegaly, rebound, tenderness


Rectal exam: PRESENT: deferred


Extremities exam: PRESENT: full ROM, other - tenderness R foot.  ABSENT: calf 

tenderness, clubbing, pedal edema


Neurological exam: PRESENT: alert, awake, oriented to person, oriented to place,

oriented to time, oriented to situation, CN II-XII grossly intact.  ABSENT: 

motor sensory deficit


Psychiatric exam: PRESENT: appropriate affect, normal mood.  ABSENT: homicidal 

ideation, suicidal ideation


Skin exam: PRESENT: dry, intact, warm.  ABSENT: cyanosis, rash





Results


Laboratory Results: 


                                        





                                 04/27/20 17:31 





                                 04/28/20 05:02 





                                        











  04/27/20 04/27/20 04/27/20





  15:55 17:31 19:08


 


WBC   7.9 


 


RBC   4.15 L 


 


Hgb   11.4 L 


 


Hct   34.6 L 


 


MCV   83 


 


MCH   27.4 


 


MCHC   32.9 


 


RDW   17.9 H 


 


Plt Count   207 


 


Seg Neutrophils %   64.3 


 


Sodium  136.3 L   138.9


 


Potassium  7.6 H*   5.8 H D


 


Chloride  108 H   110 H


 


Carbon Dioxide  19 L   22


 


Anion Gap  9   7


 


BUN  39 H   36 H


 


Creatinine  1.34 H   1.27 H


 


Est GFR ( Amer)  > 60   > 60


 


Glucose  117 H   154 H


 


Calcium  9.9   8.9


 


Magnesium  1.2 L*  


 


Total Bilirubin  0.7   0.5


 


AST  24   17


 


Alkaline Phosphatase  75   66


 


Total Protein  7.9   6.6


 


Albumin  4.4   3.5


 


Lipase  1162.7 H  


 


Urine Color   


 


Urine Appearance   


 


Urine pH   


 


Ur Specific Gravity   


 


Urine Protein   


 


Urine Glucose (UA)   


 


Urine Ketones   


 


Urine Blood   


 


Urine Nitrite   


 


Ur Leukocyte Esterase   


 


Urine WBC (Auto)   














  04/27/20 04/27/20 04/28/20





  19:16 22:09 05:02


 


WBC   


 


RBC   


 


Hgb   


 


Hct   


 


MCV   


 


MCH   


 


MCHC   


 


RDW   


 


Plt Count   


 


Seg Neutrophils %   


 


Sodium   138.5  137.4


 


Potassium   5.7 H  5.5 H


 


Chloride   110 H  108 H


 


Carbon Dioxide   19 L  21 L


 


Anion Gap   10  8


 


BUN   33 H  30 H


 


Creatinine   1.23  1.24


 


Est GFR ( Amer)   > 60  > 60


 


Glucose   218 H  175 H


 


Calcium   8.7  9.2


 


Magnesium   


 


Total Bilirubin   0.4 


 


AST   18 


 


Alkaline Phosphatase   78 


 


Total Protein   6.8 


 


Albumin   3.6 


 


Lipase   


 


Urine Color  STRAW  


 


Urine Appearance  CLEAR  


 


Urine pH  5.0  


 


Ur Specific Gravity  1.012  


 


Urine Protein  NEGATIVE  


 


Urine Glucose (UA)  50 H  


 


Urine Ketones  NEGATIVE  


 


Urine Blood  NEGATIVE  


 


Urine Nitrite  NEGATIVE  


 


Ur Leukocyte Esterase  NEGATIVE  


 


Urine WBC (Auto)  0  














  04/28/20





  05:02


 


WBC 


 


RBC 


 


Hgb 


 


Hct 


 


MCV 


 


MCH 


 


MCHC 


 


RDW 


 


Plt Count 


 


Seg Neutrophils % 


 


Sodium 


 


Potassium 


 


Chloride 


 


Carbon Dioxide 


 


Anion Gap 


 


BUN 


 


Creatinine 


 


Est GFR (African Amer) 


 


Glucose 


 


Calcium 


 


Magnesium  1.2 L*


 


Total Bilirubin 


 


AST 


 


Alkaline Phosphatase 


 


Total Protein 


 


Albumin 


 


Lipase  1035.9 H


 


Urine Color 


 


Urine Appearance 


 


Urine pH 


 


Ur Specific Gravity 


 


Urine Protein 


 


Urine Glucose (UA) 


 


Urine Ketones 


 


Urine Blood 


 


Urine Nitrite 


 


Ur Leukocyte Esterase 


 


Urine WBC (Auto) 











EKG Comments: 





Nonspecific T wave changes, sinus rhythm





Assessment and Plan





- Diagnosis


(1) Hyperkalemia


Is this a current diagnosis for this admission?: Yes   


Plan: 


Secondary to potassium supplementation, complicated by diet follow-up dietitian 

consult for patient education, continue Kayexalate, IV fluids, follow-up 

chemistry


KG continues to show no peaked T waves








(2) Hypomagnesemia


Is this a current diagnosis for this admission?: Yes   


Plan: 


Magnesia 1.2 still, will replenish and recheck








(3) Acute renal failure


Qualifiers: 


   Acute renal failure type: unspecified   Qualified Code(s): N17.9 - Acute 

kidney failure, unspecified   


Is this a current diagnosis for this admission?: Yes   


Plan: 


Prerenal state, improved








(4) Dehydration


Is this a current diagnosis for this admission?: Yes   


Plan: 


Continue with judicious IV fluids








(5) Diabetes mellitus


Qualifiers: 


   Diabetes mellitus type: type 2   Diabetes mellitus long term insulin use: 

without long term use   Diabetes mellitus complication status: without 

complication   Qualified Code(s): E11.9 - Type 2 diabetes mellitus without 

complications   


Is this a current diagnosis for this admission?: Yes   


Plan: 


Will place on sliding scale insulin.  Patient has a kinza glucose monitor








- Plan Summary


Summary: 


Appears to still have some tenderness of his right foot.  He was recently 

treated for cellulitis.  Will follow-up with an x-ray first further 

interventions as needed

## 2020-04-29 VITALS — DIASTOLIC BLOOD PRESSURE: 62 MMHG | SYSTOLIC BLOOD PRESSURE: 152 MMHG

## 2020-04-29 LAB
ANION GAP SERPL CALC-SCNC: 9 MMOL/L (ref 5–19)
BUN SERPL-MCNC: 21 MG/DL (ref 7–20)
CALCIUM: 8.7 MG/DL (ref 8.4–10.2)
CHLORIDE SERPL-SCNC: 104 MMOL/L (ref 98–107)
CO2 SERPL-SCNC: 22 MMOL/L (ref 22–30)
GLUCOSE SERPL-MCNC: 168 MG/DL (ref 75–110)
POTASSIUM SERPL-SCNC: 4.8 MMOL/L (ref 3.6–5)
URATE SERPL-MCNC: 8.4 MG/DL (ref 3.5–8.5)

## 2020-04-29 RX ADMIN — SODIUM POLYSTYRENE SULFONATE SCH GM: 15 SUSPENSION ORAL; RECTAL at 09:44

## 2020-04-29 RX ADMIN — HEPARIN SODIUM SCH UNIT: 5000 INJECTION, SOLUTION INTRAVENOUS; SUBCUTANEOUS at 05:18

## 2020-04-29 RX ADMIN — ACETAMINOPHEN PRN MG: 325 TABLET ORAL at 01:38

## 2020-04-29 RX ADMIN — TICAGRELOR SCH MG: 90 TABLET ORAL at 09:44

## 2020-04-29 RX ADMIN — ASPIRIN SCH MG: 81 TABLET, COATED ORAL at 09:44

## 2020-04-29 RX ADMIN — INSULIN LISPRO SCH UNIT: 100 INJECTION, SOLUTION INTRAVENOUS; SUBCUTANEOUS at 08:06

## 2020-04-29 RX ADMIN — INSULIN LISPRO SCH UNIT: 100 INJECTION, SOLUTION INTRAVENOUS; SUBCUTANEOUS at 08:07

## 2020-04-29 NOTE — PDOC DISCHARGE SUMMARY
Impression





- Admit/DC Date/PCP


Admission Date/Primary Care Provider: 


  04/27/20 23:22





  JANELLE SHARMA MD





Discharge Date: 04/29/20





- Discharge Diagnosis


(1) Hyperkalemia


Is this a current diagnosis for this admission?: Yes   





(2) Hypomagnesemia


Is this a current diagnosis for this admission?: Yes   





(3) Acute renal failure


Is this a current diagnosis for this admission?: Yes   





(4) Dehydration


Is this a current diagnosis for this admission?: Yes   





(5) Diabetes mellitus


Is this a current diagnosis for this admission?: Yes   





(6) Elevated lipase


Is this a current diagnosis for this admission?: Yes   





- Additional Information


Resuscitation Status: Full Code


Discharge Diet: Diabetic


Discharge Activity: Activity As Tolerated


Referrals: 


JANELLE SHARMA MD [Primary Care Provider] - 05/06/20 (Follow up labs)


Home Medications: 








Aspirin [Aspirin EC] 81 mg PO DAILY 10/18/18 


Insulin Glargine,Hum.rec.anlog [Toujeo Solostar] 15 unit SQ DAILY 10/18/18 


Insulin Lispro [Humalog Kwikpen U-100] 15 unit SQ TID 10/18/18 


Metformin HCl [Metformin HCl ER] 1,000 mg PO BID 10/18/18 


Furosemide [Lasix 20 mg Tablet] 20 mg PO QAM #10 tablet 01/03/20 


Atorvastatin Calcium [Lipitor 40 mg Tablet] 40 mg PO DAILY 04/10/20 


Cholecalciferol (Vitamin D3) [Vitamin D3 1000 Unit Tablet] 1,000 unit PO DAILY 

04/10/20 


Isosorbide Mononitrate [Imdur 30 mg Tablet.er] 15 mg PO DAILY 04/10/20 


Metoprolol Succinate [Toprol Xl 25 mg Tab.sr] 25 mg PO DAILY 04/10/20 


Nitroglycerin [Nitrostat 0.4 mg (1/150 Gr) Tabs 25/Bottle] 1 tab SL Q5MP PRN 

04/10/20 


Pantoprazole Sodium [Protonix 40 mg Dr Tablet] 40 mg PO QAM 04/10/20 


Ticagrelor [Brilinta 90 mg Tablet] 1 tab PO BID 04/10/20 


Cyclobenzaprine HCl [Flexeril 10 mg Tablet] 10 mg PO TIDP PRN 04/28/20 











History of Present Illiness


History of Present Illness: 


BRANDI ROBLES is a 74 year old male


Who presented to the emergency room with abnormal labs.  Patient was found to 

have a potassium of 7.6.  He was also found to have an elevated lipase of about 

1100.  Patient was admitted for further management.





Hospital Course


Hospital Course: 


Patient was found to be hyperkalemic.  EKG did not reveal any acute changes.  

Hyperkalemia was felt to be somewhat secondary to dietary indiscretion, possibly

iatrogenic with the use of lisinopril and dehydration.  Patient had been 

ingesting bananas and oranges.  He was treated with the standard hyperkalemia 

treatment.  He was monitored on telemetry.  After several doses of Kayexalate 

his potassium did come down and it is currently 4.8 today he had no significant 

arrhythmias on telemetry.  Patient has been advised on the need to avoid these 

agents.  His lisinopril is currently on hold as he also had MALCOLM in addition to 

the hyperkalemia but this will need to be reevaluated and restarted as 

appropriate.





She was also found to have elevated serum lipase although precise etiology of 

this is unclear.  He has some abdominal symptoms.  This could be somewhat 

related to his kidney function although with a resolution of his kidney function

his lipase is still high.  This should be followed as outpatient.  Patient has 

been able to tolerate his diet with no further issues.





Patient's magnesium was also found to be low at 1.8.  This has been corrected 

but I will suggest outpatient follow-up with magnesium level also.





Patient has remained hemodynamically stable.  He did have some pain of his right

foot which was recently treated for cellulitis.  Follow-up imaging studies 

suggested possible gout but on further evaluation patient's pain had resolved 

and so he was not treated for gout.  Uric acid level was within normal





Physical Exam


Vital Signs: 


                                        











Temp Pulse Resp BP Pulse Ox


 


 97.8 F   70   19   120/60   99 


 


 04/29/20 07:37  04/29/20 07:37  04/29/20 07:37  04/29/20 07:37  04/29/20 07:37








                                 Intake & Output











 04/28/20 04/29/20 04/30/20





 06:59 06:59 06:59


 


Intake Total 3700 820 


 


Output Total  900 


 


Balance 3700 -80 


 


Weight 94.8 kg 94.8 kg 











General appearance: PRESENT: no acute distress, well-developed, well-nourished


Head exam: PRESENT: atraumatic, normocephalic


Eye exam: PRESENT: conjunctiva pink, EOMI, PERRLA.  ABSENT: scleral icterus


Ear exam: PRESENT: normal external ear exam


Mouth exam: PRESENT: moist, tongue midline


Neck exam: ABSENT: carotid bruit, JVD, lymphadenopathy, thyromegaly


Respiratory exam: PRESENT: clear to auscultation karen, unlabored.  ABSENT: rales,

rhonchi, wheezes


Cardiovascular exam: PRESENT: RRR, +S1, +S2.  ABSENT: diastolic murmur, rubs, 

systolic murmur


Pulses: PRESENT: normal dorsalis pedis pul


Vascular exam: PRESENT: normal capillary refill


GI/Abdominal exam: PRESENT: normal bowel sounds, soft.  ABSENT: distended, 

guarding, mass, organolmegaly, rebound, tenderness


Rectal exam: PRESENT: deferred


Extremities exam: PRESENT: full ROM.  ABSENT: calf tenderness, clubbing, pedal 

edema


Neurological exam: PRESENT: alert, awake, oriented to person, oriented to place,

oriented to time, oriented to situation, CN II-XII grossly intact.  ABSENT: 

motor sensory deficit


Psychiatric exam: PRESENT: appropriate affect, normal mood.  ABSENT: homicidal 

ideation, suicidal ideation


Skin exam: PRESENT: dry, intact, warm.  ABSENT: cyanosis, rash





Results


Laboratory Results: 


                                        











WBC  7.9 10^3/uL (4.0-10.5)   04/27/20  17:31    


 


RBC  4.15 10^6/uL (4.35-5.55)  L  04/27/20  17:31    


 


Hgb  11.4 g/dL (13.5-17.0)  L  04/27/20  17:31    


 


Hct  34.6 % (37.9-51.0)  L  04/27/20  17:31    


 


MCV  83 fl (80-97)   04/27/20  17:31    


 


MCH  27.4 pg (27.0-33.4)   04/27/20  17:31    


 


MCHC  32.9 g/dL (32.0-36.0)   04/27/20  17:31    


 


RDW  17.9 % (11.5-14.0)  H  04/27/20  17:31    


 


Plt Count  207 10^3/uL (150-450)   04/27/20  17:31    


 


Lymph % (Auto)  23.8 % (13-45)   04/27/20  17:31    


 


Mono % (Auto)  10.2 % (3-13)   04/27/20  17:31    


 


Eos % (Auto)  1.6 % (0-6)   04/27/20  17:31    


 


Baso % (Auto)  0.1 % (0-2)   04/27/20  17:31    


 


Absolute Neuts (auto)  5.1 10^3/uL (1.7-8.2)   04/27/20  17:31    


 


Absolute Lymphs (auto)  1.9 10^3/uL (0.5-4.7)   04/27/20  17:31    


 


Absolute Monos (auto)  0.8 10^3/uL (0.1-1.4)   04/27/20  17:31    


 


Absolute Eos (auto)  0.1 10^3/uL (0.0-0.6)   04/27/20  17:31    


 


Absolute Basos (auto)  0.0 10^3/uL (0.0-0.2)   04/27/20  17:31    


 


Seg Neutrophils %  64.3 % (42-78)   04/27/20  17:31    


 


Sodium  135.1 mmol/L (137-145)  L  04/29/20  05:38    


 


Potassium  4.8 mmol/L (3.6-5.0)   04/29/20  05:38    


 


Chloride  104 mmol/L ()   04/29/20  05:38    


 


Carbon Dioxide  22 mmol/L (22-30)   04/29/20  05:38    


 


Anion Gap  9  (5-19)   04/29/20  05:38    


 


BUN  21 mg/dL (7-20)  H  04/29/20  05:38    


 


Creatinine  1.08 mg/dL (0.52-1.25)   04/29/20  05:38    


 


Est GFR ( Amer)  > 60  (>60)   04/29/20  05:38    


 


Est GFR (MDRD) Non-Af  > 60  (>60)   04/29/20  05:38    


 


Glucose  168 mg/dL ()  H  04/29/20  05:38    


 


POC Glucose  181 mg/dL ()  H  04/29/20  06:07    


 


Uric Acid  8.4 mg/dL (3.5-8.5)   04/29/20  05:38    


 


Calcium  8.7 mg/dL (8.4-10.2)   04/29/20  05:38    


 


Magnesium  1.6 mg/dL (1.6-2.3)   04/29/20  05:38    


 


Total Bilirubin  0.4 mg/dL (0.2-1.3)   04/27/20  22:09    


 


Direct Bilirubin  0.0 mg/dL (0.0-0.4)   04/27/20  22:09    


 


Neonat Total Bilirubin  Not Reportable   04/27/20  22:09    


 


Neonat Direct Bilirubin  Not Reportable   04/27/20  22:09    


 


Neonat Indirect Bili  Not Reportable   04/27/20  22:09    


 


AST  18 U/L (17-59)   04/27/20  22:09    


 


ALT  14 U/L (<50)   04/27/20  22:09    


 


Alkaline Phosphatase  78 U/L ()   04/27/20  22:09    


 


Total Protein  6.8 g/dL (6.3-8.2)   04/27/20  22:09    


 


Albumin  3.6 g/dL (3.5-5.0)   04/27/20  22:09    


 


Lipase  783.7 U/L ()  H  04/29/20  05:38    


 


Urine Color  STRAW   04/27/20  19:16    


 


Urine Appearance  CLEAR   04/27/20  19:16    


 


Urine pH  5.0  (5.0-9.0)   04/27/20  19:16    


 


Ur Specific Gravity  1.012   04/27/20  19:16    


 


Urine Protein  NEGATIVE mg/dL (NEGATIVE)   04/27/20  19:16    


 


Urine Glucose (UA)  50 mg/dL (NEGATIVE)  H  04/27/20  19:16    


 


Urine Ketones  NEGATIVE mg/dL (NEGATIVE)   04/27/20  19:16    


 


Urine Blood  NEGATIVE  (NEGATIVE)   04/27/20  19:16    


 


Urine Nitrite  NEGATIVE  (NEGATIVE)   04/27/20  19:16    


 


Urine Bilirubin  NEGATIVE  (NEGATIVE)   04/27/20  19:16    


 


Urine Urobilinogen  NEGATIVE mg/dL (<2.0)   04/27/20  19:16    


 


Ur Leukocyte Esterase  NEGATIVE  (NEGATIVE)   04/27/20  19:16    


 


Urine WBC (Auto)  0 /HPF  04/27/20  19:16    


 


Urine Mucus (Auto)  RARE /LPF  04/27/20  19:16    


 


Urine Ascorbic Acid  NEGATIVE  (NEGATIVE)   04/27/20  19:16    











EKG Comments: 





Nonspecific T wave changes


Impressions: 


                                        





Foot X-Ray  04/28/20 00:00


IMPRESSION:  Probable gout involving the 1st metatarsal phalangeal joint.  No 

acute fracture or dislocation.


 














Plan


Health Concerns: 


Follow-up BMP, magnesium as well as lipase.  Reevaluation of medications 

including lisinopril also suggested


Time Spent: Greater than 30 Minutes





Stroke


Is this a Stroke Patient?: No





Acute Heart Failure





- **


Is this a Heart Failure Patient?: No